# Patient Record
Sex: FEMALE | Race: WHITE | NOT HISPANIC OR LATINO | Employment: OTHER | URBAN - METROPOLITAN AREA
[De-identification: names, ages, dates, MRNs, and addresses within clinical notes are randomized per-mention and may not be internally consistent; named-entity substitution may affect disease eponyms.]

---

## 2019-01-13 ENCOUNTER — HOSPITAL ENCOUNTER (EMERGENCY)
Facility: CLINIC | Age: 34
Discharge: HOME | End: 2019-01-13
Admitting: PHYSICIAN ASSISTANT
Payer: COMMERCIAL

## 2019-01-13 ENCOUNTER — APPOINTMENT (OUTPATIENT)
Dept: GENERAL RADIOLOGY | Facility: CLINIC | Age: 34
End: 2019-01-13
Payer: COMMERCIAL

## 2019-01-13 VITALS
DIASTOLIC BLOOD PRESSURE: 82 MMHG | TEMPERATURE: 97.1 F | HEART RATE: 108 BPM | BODY MASS INDEX: 37.07 KG/M2 | OXYGEN SATURATION: 100 % | RESPIRATION RATE: 18 BRPM | WEIGHT: 243.83 LBS | SYSTOLIC BLOOD PRESSURE: 115 MMHG

## 2019-01-13 DIAGNOSIS — G56.12 MEDIAN NERVE NEUROPATHY, LEFT: ICD-10-CM

## 2019-01-13 DIAGNOSIS — M25.532 LEFT WRIST PAIN: ICD-10-CM

## 2019-01-13 PROCEDURE — 73130 X-RAY EXAM OF HAND: CPT | Mod: LT

## 2019-01-13 PROCEDURE — 99284 EMERGENCY DEPT VISIT MOD MDM: CPT | Mod: 25

## 2019-01-13 PROCEDURE — 73110 X-RAY EXAM OF WRIST: CPT | Mod: LT

## 2019-01-13 PROCEDURE — 29125 APPL SHORT ARM SPLINT STATIC: CPT | Mod: LT

## 2019-01-13 RX ORDER — DULOXETIN HYDROCHLORIDE 20 MG/1
20 CAPSULE, DELAYED RELEASE ORAL 2 TIMES DAILY
COMMUNITY

## 2019-01-13 ASSESSMENT — ENCOUNTER SYMPTOMS
WEAKNESS: 0
NUMBNESS: 1

## 2019-01-13 NOTE — ED PROVIDER NOTES
History     Chief Complaint:  Wrist injury    The history is provided by the patient.      Daylin Yates is a 33 year old female who presents for evaluation of a wrist injury. The patient reports that she was ice skating a few hours ago when she slipped and fell backwards, landing on her outstretched left hand. The patient states that she did not have a large amount of pain at first, but that the pain exacerbated over the next several hours. She states that she moved her wrist in the last hour and had a shooting pain all the way up her arm from her wrist, prompting her to present to the ED. Here, the patient notes that her finger tips feel numb and tingly, but this is most pronounced in her middle finger. She is right hand dominant.    Allergies:  Penicillins  Sulfa drugs  Latex     Medications:    Cymbalta  Mirena    Past Medical History:    Bipolar disorder  Fibromyalgia     Past Surgical History:    Knee arthroscopy  Wrist arthroscopy, clean/drain    Family History:    Diabetes  Breast cancer  Alzheimer disease     Social History:   Natalie Roe MD   Marital Status:  Single  Smoking status: never  Alcohol use: yes, 1 drink per week      Review of Systems   Musculoskeletal:        Left wrist pain   Neurological: Positive for numbness. Negative for weakness.   All other systems reviewed and are negative.    Physical Exam     Patient Vitals for the past 24 hrs:   BP Temp Temp src Pulse Heart Rate Resp SpO2 Weight   01/13/19 1650 115/82 97.1  F (36.2  C) Temporal 108 108 18 100 % 110.6 kg (243 lb 13.3 oz)        Physical Exam  MSK:  Normal movement through the elbow, wrist, and fingers without tendonous deficit.  No gross deformity or effusion.  Tenderness to palpation over left radial styloid and thenar eminence. No tenderness to palpation over anatomic snuffbox, metacarpal bones.  Pain with axial loading of the thumb.    SKIN:  Warm and dry with strong radial pulse and normal capillary refill.    NEURO:   Strength in wrist and thumb limited secondary to pain  Strength otherwise normal in fingers, wrist, elbow.  Decreased sensation through distribution of median nerve over 3rd finger more pronounced on volar surface.  Normal sensation throughout radian and ulnar distributions.  PSYCH:  Normal affect     Emergency Department Course     Imaging:  Radiographic findings were communicated with the patient who voiced understanding of the findings.    XR Wrist Left G/E 3 Views  Impression: No fracture is visualized. Bony alignment appears to be  within normal limits. No significant degenerative change.  As read by Radiology.     XR Hand Left G/E 3 Views   Impression: No fracture is visualized. Bony alignment appears to be  within normal limits. No significant degenerative change.  As read by Radiology.     Emergency Department Course:  Past medical records, nursing notes, and vitals reviewed.  165: I performed an exam of the patient and obtained history, as documented above.  The patient was sent for a Xray while in the emergency department, findings above.      175: I rechecked the patient. Explained findings to the patient.     I rechecked the patient. Findings and plan explained to the Patient. Patient discharged home with instructions regarding supportive care, medications, and reasons to return. The importance of close follow-up was reviewed.      Impression & Plan      Medical Decision Makin year old female presents with left wrist and hand pain after FOOSH.  Patient history and records reviewed.  Broad differential was considered.  Patient is well appearing with stable vitals.  X-rays were obtained which demonstrate no evidence of fracture or dislocation.  Examination of joint above and below does not suggest referred pain and do not feel radiographs of these joints are indicated at this time.  She does have some decreased sensation in the distribution of the median nerve, however strength appears normal.   Neurovascular status is otherwise intact distally and no signs of compartment syndrome.    I did consider occult scaphoid fracture, and she does have some pain with axial loading of the thumb, as well as minimal pain with deep palpation to the anatomic snuffbox.  For this reason will place in a removable thumb spica splint and have patient follow-up for repeat x-rays in 7-10 days.  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  No signs tendonous defect in fingers or hand.    Recommended conservative treatment with NSAIDS, ice.  Discussed indications to return to ED if any new or worsening symptoms develop.    Diagnosis:    ICD-10-CM    1. Left wrist painAcute M25.532    2. Median nerve neuropathy, leftAcute G56.12        Disposition:  discharged to home    Megan Beh  1/13/2019   [facility] EMERGENCY DEPARTMENT  I, Megan Beh, am serving as a scribe at 4:56 PM on 1/13/2019 to document services personally performed by Tera Bergman based on my observations and the provider's statements to me.       Tera Bergman PA-C  01/13/19 1818

## 2019-01-13 NOTE — ED AVS SNAPSHOT
[facility] Emergency Department  [address]  [facility] [address]  Phone:  [phone]  Fax:  [phone]                                    Daylin Yates   MRN: [id number]    Department:  [facility] Emergency Department   Date of Visit:  1/13/2019           After Visit Summary Signature Page    I have received my discharge instructions, and my questions have been answered. I have discussed any challenges I see with this plan with the nurse or doctor.    ..........................................................................................................................................  Patient/Patient Representative Signature      ..........................................................................................................................................  Patient Representative Print Name and Relationship to Patient    ..................................................               ................................................  Date                                   Time    ..........................................................................................................................................  Reviewed by Signature/Title    ...................................................              ..............................................  Date                                               Time          22EPIC Rev 08/18

## 2019-01-13 NOTE — ED TRIAGE NOTES
Pt was ice skating and fell, landing onto her left hand. Injury happened at about 1 pm today. Pt states she cannot feel her middle finger, movement intact. No meds taken for pain prior to arrival.

## 2020-01-07 ENCOUNTER — OFFICE VISIT (OUTPATIENT)
Dept: MIDWIFE SERVICES | Facility: CLINIC | Age: 35
End: 2020-01-07
Payer: COMMERCIAL

## 2020-01-07 VITALS
WEIGHT: 250 LBS | OXYGEN SATURATION: 98 % | DIASTOLIC BLOOD PRESSURE: 77 MMHG | SYSTOLIC BLOOD PRESSURE: 114 MMHG | BODY MASS INDEX: 38.01 KG/M2 | HEART RATE: 94 BPM

## 2020-01-07 DIAGNOSIS — Z30.432 ENCOUNTER FOR IUD REMOVAL: ICD-10-CM

## 2020-01-07 DIAGNOSIS — Z12.4 CERVICAL CANCER SCREENING: Primary | ICD-10-CM

## 2020-01-07 DIAGNOSIS — Z30.430 ENCOUNTER FOR IUD INSERTION: ICD-10-CM

## 2020-01-07 PROCEDURE — G0145 SCR C/V CYTO,THINLAYER,RESCR: HCPCS | Performed by: ADVANCED PRACTICE MIDWIFE

## 2020-01-07 PROCEDURE — 58300 INSERT INTRAUTERINE DEVICE: CPT | Performed by: ADVANCED PRACTICE MIDWIFE

## 2020-01-07 PROCEDURE — 87624 HPV HI-RISK TYP POOLED RSLT: CPT | Performed by: ADVANCED PRACTICE MIDWIFE

## 2020-01-07 PROCEDURE — 58301 REMOVE INTRAUTERINE DEVICE: CPT | Performed by: ADVANCED PRACTICE MIDWIFE

## 2020-01-07 PROCEDURE — G0476 HPV COMBO ASSAY CA SCREEN: HCPCS | Performed by: ADVANCED PRACTICE MIDWIFE

## 2020-01-07 NOTE — LETTER
January 14, 2020    Daylin Yates  [address] [facility] [address]    Dear ,  This letter is regarding your recent Pap smear (cervical cancer screening) and Human Papillomavirus (HPV) test.  We are happy to inform you that your Pap smear result is normal. Cervical cancer is closely linked with certain types of HPV. Your results showed no evidence of high-risk HPV.  We recommend you have your next PAP smear and HPV test in 5 years.  You will still need to return to the clinic every year for an annual exam and other preventive tests.  If you have additional questions regarding this result, please call our registered nurse, Jaki at [phone].  Sincerely,    Daylin Shukla CNM, APRN //[email or web address]

## 2020-01-07 NOTE — PROGRESS NOTES
Daylin Yates, who is a 34 year old female with an obstetric hstory of      Presents to day for IUD removal and replacement. She is also due for her pap smear.She has a mirena IUD in place and loves that it eliminated her long and painful periods, she would like a new Mirena replaced.     Risks and bens were reviewed including risk of infection, perforation of uterine muscle, IUD expulsion, and minimal risk of pregnancy.  Pt hx was reviewed and pt is appropriate candidate.         If choosing Mirena IUD, specific risks include irregular menses, amenorrhea and/or spotting especially in the next 3-6 months.    Pt was educated in checking for strings once a month and s/s of infection.    Verbal consent was obtained from the patient.    Exam:EXAM:  Constitutional: healthy, alert and no distress  Vagina and vulva are normal;  no discharge is noted.  Cervix normal without lesions. Uterus anteverted and mobile, normal in size and shape without tenderness.  Adnexa normal in size without masses or tenderness. Pap smear collected.    Removal procedure:   IUD strings visualized extending 2 cm from os. IUD strings were grasped with ringed forceps. The IUD was removed using gentle traction. IUD was inspected and appeared intact. Shown to patient. Daylin tolerated the removal procedure well.     Procedure: Sterile speculum placed, cervical os cleansed with beta dine.  Tenaculum placed on anterior  cervix, uterus easily sounded to 7.5 cm.   IUD placed without difficulty, strings trimmed to approx 1 inch outside of cervical os.    Lot # [id number] expiration date     Pt tolerated procedure well with minimal cramping.  D/C home with instruction letter.  Call with questions or concerns.    Daylin Shukla, JOSHUAM, APRN CNM

## 2020-01-09 LAB
COPATH REPORT: NORMAL
PAP: NORMAL

## 2020-01-12 LAB
FINAL DIAGNOSIS: NORMAL
HPV HR 12 DNA CVX QL NAA+PROBE: NEGATIVE
HPV16 DNA SPEC QL NAA+PROBE: NEGATIVE
HPV18 DNA SPEC QL NAA+PROBE: NEGATIVE
SPECIMEN DESCRIPTION: NORMAL
SPECIMEN SOURCE CVX/VAG CYTO: NORMAL

## 2020-02-22 ENCOUNTER — APPOINTMENT (OUTPATIENT)
Dept: CT IMAGING | Facility: CLINIC | Age: 35
End: 2020-02-22
Attending: INTERNAL MEDICINE
Payer: COMMERCIAL

## 2020-02-22 ENCOUNTER — HOSPITAL ENCOUNTER (OUTPATIENT)
Facility: CLINIC | Age: 35
Setting detail: OBSERVATION
Discharge: HOME | End: 2020-02-23
Attending: INTERNAL MEDICINE | Admitting: HOSPITALIST
Payer: COMMERCIAL

## 2020-02-22 DIAGNOSIS — R29.898 UPPER EXTREMITY WEAKNESS: ICD-10-CM

## 2020-02-22 DIAGNOSIS — R29.898 WEAKNESS OF RIGHT LOWER EXTREMITY: ICD-10-CM

## 2020-02-22 PROBLEM — R68.89 MULTIPLE COMPLAINTS: Status: ACTIVE | Noted: 2020-02-22

## 2020-02-22 LAB
ANION GAP SERPL CALCULATED.3IONS-SCNC: 4 MMOL/L (ref 3–14)
APTT PPP: 32 SEC (ref 22–37)
BASOPHILS # BLD AUTO: 0 10E9/L (ref 0–0.2)
BASOPHILS NFR BLD AUTO: 0.3 %
BUN SERPL-MCNC: 8 MG/DL (ref 7–30)
CALCIUM SERPL-MCNC: 9.2 MG/DL (ref 8.5–10.1)
CHLORIDE SERPL-SCNC: 104 MMOL/L (ref 94–109)
CO2 SERPL-SCNC: 29 MMOL/L (ref 20–32)
CREAT SERPL-MCNC: 0.66 MG/DL (ref 0.52–1.04)
DIFFERENTIAL METHOD BLD: ABNORMAL
EOSINOPHIL # BLD AUTO: 0.1 10E9/L (ref 0–0.7)
EOSINOPHIL NFR BLD AUTO: 0.8 %
ERYTHROCYTE [DISTWIDTH] IN BLOOD BY AUTOMATED COUNT: 12.6 % (ref 10–15)
GFR SERPL CREATININE-BSD FRML MDRD: >90 ML/MIN/{1.73_M2}
GLUCOSE BLDC GLUCOMTR-MCNC: 104 MG/DL (ref 70–99)
GLUCOSE SERPL-MCNC: 132 MG/DL (ref 70–99)
HCT VFR BLD AUTO: 39.6 % (ref 35–47)
HGB BLD-MCNC: 13 G/DL (ref 11.7–15.7)
IMM GRANULOCYTES # BLD: 0.1 10E9/L (ref 0–0.4)
IMM GRANULOCYTES NFR BLD: 0.4 %
INR PPP: 0.99 (ref 0.86–1.14)
LITHIUM SERPL-SCNC: 0.52 MMOL/L (ref 0.6–1.2)
LYMPHOCYTES # BLD AUTO: 2.3 10E9/L (ref 0.8–5.3)
LYMPHOCYTES NFR BLD AUTO: 17.5 %
MCH RBC QN AUTO: 28.3 PG (ref 26.5–33)
MCHC RBC AUTO-ENTMCNC: 32.8 G/DL (ref 31.5–36.5)
MCV RBC AUTO: 86 FL (ref 78–100)
MONOCYTES # BLD AUTO: 0.7 10E9/L (ref 0–1.3)
MONOCYTES NFR BLD AUTO: 5 %
NEUTROPHILS # BLD AUTO: 10 10E9/L (ref 1.6–8.3)
NEUTROPHILS NFR BLD AUTO: 76 %
NRBC # BLD AUTO: 0 10*3/UL
NRBC BLD AUTO-RTO: 0 /100
PLATELET # BLD AUTO: 336 10E9/L (ref 150–450)
POTASSIUM SERPL-SCNC: 3.6 MMOL/L (ref 3.4–5.3)
RBC # BLD AUTO: 4.59 10E12/L (ref 3.8–5.2)
SODIUM SERPL-SCNC: 137 MMOL/L (ref 133–144)
TROPONIN I SERPL-MCNC: <0.015 UG/L (ref 0–0.04)
WBC # BLD AUTO: 13.1 10E9/L (ref 4–11)

## 2020-02-22 PROCEDURE — 0042T CT HEAD PERFUSION WITH CONTRAST: CPT

## 2020-02-22 PROCEDURE — 70450 CT HEAD/BRAIN W/O DYE: CPT | Mod: 59

## 2020-02-22 PROCEDURE — 70496 CT ANGIOGRAPHY HEAD: CPT

## 2020-02-22 PROCEDURE — 25000132 ZZH RX MED GY IP 250 OP 250 PS 637: Performed by: PHYSICIAN ASSISTANT

## 2020-02-22 PROCEDURE — 99285 EMERGENCY DEPT VISIT HI MDM: CPT | Mod: 25

## 2020-02-22 PROCEDURE — 99220 ZZC INITIAL OBSERVATION CARE,LEVL III: CPT | Performed by: PHYSICIAN ASSISTANT

## 2020-02-22 PROCEDURE — 93005 ELECTROCARDIOGRAM TRACING: CPT

## 2020-02-22 PROCEDURE — G0378 HOSPITAL OBSERVATION PER HR: HCPCS

## 2020-02-22 PROCEDURE — 85025 COMPLETE CBC W/AUTO DIFF WBC: CPT | Performed by: INTERNAL MEDICINE

## 2020-02-22 PROCEDURE — 85610 PROTHROMBIN TIME: CPT | Performed by: INTERNAL MEDICINE

## 2020-02-22 PROCEDURE — 85730 THROMBOPLASTIN TIME PARTIAL: CPT | Performed by: INTERNAL MEDICINE

## 2020-02-22 PROCEDURE — 25000128 H RX IP 250 OP 636: Performed by: INTERNAL MEDICINE

## 2020-02-22 PROCEDURE — 80048 BASIC METABOLIC PNL TOTAL CA: CPT | Performed by: INTERNAL MEDICINE

## 2020-02-22 PROCEDURE — 00000146 ZZHCL STATISTIC GLUCOSE BY METER IP

## 2020-02-22 PROCEDURE — 84484 ASSAY OF TROPONIN QUANT: CPT | Performed by: INTERNAL MEDICINE

## 2020-02-22 PROCEDURE — 25000125 ZZHC RX 250: Performed by: INTERNAL MEDICINE

## 2020-02-22 PROCEDURE — 80178 ASSAY OF LITHIUM: CPT | Performed by: INTERNAL MEDICINE

## 2020-02-22 RX ORDER — NALOXONE HYDROCHLORIDE 0.4 MG/ML
.1-.4 INJECTION, SOLUTION INTRAMUSCULAR; INTRAVENOUS; SUBCUTANEOUS
Status: DISCONTINUED | OUTPATIENT
Start: 2020-02-22 | End: 2020-02-23 | Stop reason: HOSPADM

## 2020-02-22 RX ORDER — ACETAMINOPHEN 650 MG/1
650 SUPPOSITORY RECTAL EVERY 4 HOURS PRN
Status: DISCONTINUED | OUTPATIENT
Start: 2020-02-22 | End: 2020-02-23 | Stop reason: HOSPADM

## 2020-02-22 RX ORDER — IOPAMIDOL 755 MG/ML
500 INJECTION, SOLUTION INTRAVASCULAR ONCE
Status: COMPLETED | OUTPATIENT
Start: 2020-02-22 | End: 2020-02-22

## 2020-02-22 RX ORDER — LITHIUM CARBONATE 300 MG/1
600 TABLET, FILM COATED, EXTENDED RELEASE ORAL 2 TIMES DAILY
COMMUNITY

## 2020-02-22 RX ORDER — DULOXETIN HYDROCHLORIDE 20 MG/1
20 CAPSULE, DELAYED RELEASE ORAL 2 TIMES DAILY
Status: DISCONTINUED | OUTPATIENT
Start: 2020-02-22 | End: 2020-02-23 | Stop reason: HOSPADM

## 2020-02-22 RX ORDER — ONDANSETRON 2 MG/ML
4 INJECTION INTRAMUSCULAR; INTRAVENOUS EVERY 6 HOURS PRN
Status: DISCONTINUED | OUTPATIENT
Start: 2020-02-22 | End: 2020-02-23 | Stop reason: HOSPADM

## 2020-02-22 RX ORDER — MULTIVITAMIN,THERAPEUTIC
1 TABLET ORAL DAILY
COMMUNITY

## 2020-02-22 RX ORDER — ONDANSETRON 4 MG/1
4 TABLET, ORALLY DISINTEGRATING ORAL EVERY 6 HOURS PRN
Status: DISCONTINUED | OUTPATIENT
Start: 2020-02-22 | End: 2020-02-23 | Stop reason: HOSPADM

## 2020-02-22 RX ORDER — ACETAMINOPHEN 325 MG/1
650 TABLET ORAL EVERY 4 HOURS PRN
Status: DISCONTINUED | OUTPATIENT
Start: 2020-02-22 | End: 2020-02-23 | Stop reason: HOSPADM

## 2020-02-22 RX ORDER — TRIAMCINOLONE ACETONIDE 1 MG/G
CREAM TOPICAL 2 TIMES DAILY PRN
COMMUNITY

## 2020-02-22 RX ORDER — LITHIUM CARBONATE 300 MG/1
600 TABLET, FILM COATED, EXTENDED RELEASE ORAL 2 TIMES DAILY
Status: DISCONTINUED | OUTPATIENT
Start: 2020-02-22 | End: 2020-02-23 | Stop reason: HOSPADM

## 2020-02-22 RX ADMIN — SODIUM CHLORIDE 95 ML: 9 INJECTION, SOLUTION INTRAVENOUS at 15:53

## 2020-02-22 RX ADMIN — IOPAMIDOL 120 ML: 755 INJECTION, SOLUTION INTRAVENOUS at 15:53

## 2020-02-22 RX ADMIN — DULOXETINE HYDROCHLORIDE 20 MG: 20 CAPSULE, DELAYED RELEASE ORAL at 21:28

## 2020-02-22 RX ADMIN — LITHIUM CARBONATE 600 MG: 300 TABLET, FILM COATED, EXTENDED RELEASE ORAL at 21:28

## 2020-02-22 ASSESSMENT — ENCOUNTER SYMPTOMS
WEAKNESS: 1
HEADACHES: 1
CONFUSION: 1

## 2020-02-22 NOTE — ED NOTES
Bed: ED02  Expected date: 2/22/20  Expected time:   Means of arrival: Ambulance  Comments:  A594; 34F STROKE EVAL

## 2020-02-22 NOTE — PHARMACY-ADMISSION MEDICATION HISTORY
.Admission medication history interview status for this patient is complete. See [facility] admission navigator for allergy information, prior to admission medications and immunization status.     Medication history interview source(s):Patient  Medication history resources (including written lists, pill bottles, clinic record):med list  [facility]    Changes made to PTA medication list:  Added: lithobid, mvi, and tmc cream  Deleted: prenatal  Changed: none    Actions taken by pharmacist (provider contacted, etc):None     Additional medication history information:None    Medication reconciliation/reorder completed by provider prior to medication history?  Yes     Do you take OTC medications (eg tylenol, ibuprofen, fish oil, eye/ear drops, etc)? Yes     For patients on insulin therapy: No      Prior to Admission medications    Medication Sig Last Dose Taking? Auth Provider   DULoxetine (CYMBALTA) 20 MG capsule Take 20 mg by mouth 2 times daily 2/22/2020 at 1100 Yes Reported, Patient   lithium ER (LITHOBID) 300 MG CR tablet Take 600 mg by mouth 2 times daily 2/22/2020 at 1100 Yes Unknown, Entered By History   multivitamin, therapeutic (THERA-VIT) TABS tablet Take 1 tablet by mouth daily 2/21/2020 at Unknown time Yes Unknown, Entered By History   levonorgestrel (MIRENA) 20 MCG/24HR IUD 1 each (20 mcg) by Intrauterine route once for 1 dose   Daylin Shukla APRN CNM   levonorgestrel (MIRENA) 20 MCG/24HR IUD 1 each (20 mcg) by Intrauterine route once for 1 dose   Dell Roman APRN CNM   triamcinolone (KENALOG) 0.1 % external cream Apply topically 2 times daily as needed for irritation (eczema) More than a month at Unknown time  Unknown, Entered By History

## 2020-02-22 NOTE — CONSULTS
[facility]    Stroke Telephone Note    I was called by Dr. Dayanna Pabon on 02/22/20 at 1541 regarding patient Daylin Yates. The patient is a 34 year old female with no past medical history who presented with acute onset of vague neurologic symptoms.  Per Dr. Pabon the patient reported that around 1330 the patient developed tunnel vision in her right eye only, twitching in her right eye, and weakness in her right arm and right leg.  On exam she is intermittently able to follow a finger for EOMs but then occasionally struggles.  On exam she is reportedly able to raise both her right arm and right leg and maintain somewhat but then they both abruptly fall to the bed.  No other deficits noted on exam.    Of note she was seen in neurology clinic on 2/3/20 for multiple vague neurologic complaints.  She had reported paresthesias in all four limbs, muscle spasms, dizziness, occasional headache, tremors, decreased vision I her left eye, shooting sensation down her spine, and tremors in her hands.  She has had MRI of her entire spine in 2016 or 2017 which was normal.  She had a MRI Brain without and with IV contrast which was normal on 2/4/20.    Stroke Code Data  (for stroke code without tele)  Stroke code activated 02/22/20   1540   First stroke provider response         Last known normal 02/22/20   1330   Time of discovery   (or onset of symptoms) 02/22/20   1330   Head CT read by me 02/22/20   1546   Was stroke code de-escalated? Yes 02/22/20 1604  other (see comments) No LVO seen, mild deficits that seem to not be consistent with an infarction, recent neurologic work up that was negative     TPA Treatment   Not given due to suspected stroke mimic, vague neurologic symptoms and do not seen disabling, recently had full neurologic work up for vauge symptoms.    Endovascular Treatment  Not initiated due to absence of proximal vessel occlusion    Impression  Vague neurologic  deficits    Recommendations  - MRI Brain without and with IV contrast  - PT/OT  - If MRI negative for stroke then recommend general neurology consultation, if positive admitting team can place consult for telestroke evaluation.    My recommendations are based on the information provided on the phone by Dr. Dayanna Pabon.    Laurel Wynn MD   Neurocritical Care    Text Page ([phone])

## 2020-02-22 NOTE — ED TRIAGE NOTES
"Arrives via EMS. At 1330 today patient developed right sided weakness and \"tunnel vision\" in right eye. 1/10 headache on left side. Feels like she is confused, \"couldn't remember my daughters name\" No new medications per patient. Vitals stable. Blood pressure 92/48. EKG negative. . Stroke eval called.   "

## 2020-02-22 NOTE — H&P
[facility]  Outpatient/Observation Unit  Admission History and Physical Examination    NAME: Daylin Yates   : 1985   MRN: [id number]     Date of Admission:  2020    Assessment & Plan   Daylin Yates is a 34 year old female with past medical history of depression, bipolar disorder, atypical neurologic symptoms who presents with her parents complaining of weakness of right upper and lower extremity as well as double vision in her right eye that developed today when playing with her daughter.      Patient presented to ED afebrile, VSS.  Work-up revealed glucose 132, BMP within normal limits, initial troponin under 0.015.  CBC with slight neutrophilic leukocytosis of 13.1, no anemia.  CT of the head without contrast showed no evidence of acute infarct or significant orbital abnormality.  CT a head and neck negative for significant stenosis or occlusion in the anterior or posterior circulation.  CT of the head without focal or regional perfusion deficits. EKG NSR, rate 87.     I was asked to admit the patient to observation for further monitoring.     #Vague neurologic complaints  With RLE and RUE weakness, tunnel to double vision change in right eye.  On my evaluation able to utilize extremities without limitation.   Unclear etiology at this time, atypical symptoms unlikely due to CVA or TIA.  Patient has recently had a full neurologic work-up as outlined below for similar atypical symptoms, further details documented in Neurology note on chart review.   - Stroke Neurology was consulted in the ED; recommended obtaining repeat MRI, no Plavix loading  - Consider PT/OT  - Nursing neuro checks    #History of Multiple areas of Paraesthesias  #Possible Migraine Headaches  #Unspecified Vision loss  Patient has a variety of symptoms without localization to one lesion, absence of any objective findings.  Has been seen in the outpatient setting recently by Dr. Tenorio with [facility]  "Neurology; Evaluated patient with MRI and connective tissue disorders which were negative per chart review.    #Leukocytosis  Neutrophilic leukocytosis of unclear etiology at this time.  Patient is afebrile, nonseptic appearing and without focal symptoms.  - Monitor for fever or symtpoms  - Repeat CBC in a.m.    #Bipolar Disorder  #Depression  Stable per patient.   -Resume PTA lithium, Cymbalta    DVT Prophylaxis: Ambulate every shift  Code Status: Full Code  Expected discharge: Tomorrow, recommended to prior living arrangement once MRI completed, return to baseline functional status..    Emerita Gupta PA-C    Primary Care Physician   *[facility]    Chief Complaint   Weakness, vision changes    History is obtained from the patient.  Patient's parents and child present for my evaluation.    Discussed with Dr. Pabon in the ED, full chart review including lab work, imaging, and vital signs were reviewed.  Dr. Cabrales discussed the patient with stroke neurology who recommended obtaining repeat MRI.       History of Present Illness   Daylin Yates is a 34 year old female with a past medical history of bipolar disorder, fibromyalgia, possible migraine headaches, multiple areas of paraesthesias who presents with complaints of weakness and vision changes.  The patient reports that she was in her usual state of health this morning when she awoke.  She was sitting on the floor and playing with her daughter when she felt as if she was having a \"dizzy spell \"she describes this as feeling lightheaded and then that the room was spinning.  This episode lasted for a few minutes.  She then noticed a diffuse tingling sensation throughout her whole body which she is still having.  She additionally describes both right arm and right leg generalized weakness, which she states is still present on my evaluation however is able to adjust her gown and remove her glasses with her right hand.  The patient states that she " "got up to walk around and make her daughter a sandwich later on and felt progressively more weak.  She had difficulty holding onto the knife to cut her sandwich in half.  Around 130 she then noticed that she was having a change in her vision described as tunnellike vision in her right eye worse than her left.  The tunnel vision lasted for several minutes and then became blurry. She than experience bilateral eye twitching. She contacted the [facility] line who advised her to present to the ED.  Upon waiting for EMS to arrive she described that she became slightly confused and described this as being unable to recall the last to get that set her phone number and her daughter's name.  She additionally noted that her face became hot feeling on the right side.  She notes that the symptoms are similar to what she has had in the past however \"everything occurred together this time\" and then she became slightly sleepy feeling.  The patient cannot quantify or specify how often the symptoms occur.  She states that she does have baseline vision problems described as astigmatism in her right eye.  She sees an optometrist and wears glasses for this.  No new medication changes or drug ingestions.   The patient reports hitting her head on her bed frame about a week and a half ago no other injuries.  She denies losing consciousness with this.  No falls, syncope or presyncope.  Endorses a diffuse chronic tension type headache.  No chest pain, shortness of breath, cough, dyspnea.  No fevers, chills, abdominal pain, emesis.      Past Medical History    I have reviewed this patient's medical history and updated it with pertinent information if needed.   Past Medical History:   Diagnosis Date     Bipolar disorder (H) 2011    Lamictal weaned off at beginning of preg     Ectopic pregnancy 2007    MTX       Past Surgical History   I have reviewed this patient's surgical history and updated it with pertinent information if " needed.  Past Surgical History:   Procedure Laterality Date     ARTHROSCOPY KNEE       C WRIST ARTHROSCOP,CLEAN/DRAIN       ESOPHAGOSCOPY, GASTROSCOPY, DUODENOSCOPY (EGD), COMBINED  10/16/2012    Procedure: COMBINED ESOPHAGOSCOPY, GASTROSCOPY, DUODENOSCOPY (EGD), BIOPSY SINGLE OR MULTIPLE;;  Surgeon: Johny Celaya MD;  Location: [facility]     PODIATRY/FOOT & ANKLE SURGERY REFERRAL       wisdom teeth         Prior to Admission Medications   Prior to Admission Medications   Prescriptions Last Dose Informant Patient Reported? Taking?   DULoxetine (CYMBALTA) 20 MG capsule   Yes No   Sig: Take 20 mg by mouth 2 times daily   PRENATAL VITAMINS PO   Yes No   levonorgestrel (MIRENA) 20 MCG/24HR IUD   No No   Si each (20 mcg) by Intrauterine route once for 1 dose   levonorgestrel (MIRENA) 20 MCG/24HR IUD   No No   Si each (20 mcg) by Intrauterine route once for 1 dose      Facility-Administered Medications: None     Allergies   Allergies   Allergen Reactions     Penicillins Anaphylaxis     Sulfa Drugs Hives     Latex Hives       Social History   I have reviewed this patient's social history and updated it with pertinent information if needed. Daylin Yates  reports that she has never smoked. She has never used smokeless tobacco. She reports current alcohol use. She reports that she does not use drugs.  The patient has 1 daughter.      Family History   I have reviewed this patient's family history and updated it with pertinent information if needed.   Family History   Problem Relation Age of Onset     Diabetes Maternal Aunt      Diabetes Maternal Grandmother      Diabetes Sister 3     Diabetes Maternal Uncle 18     Breast Cancer Paternal Aunt      Alzheimer Disease Paternal Grandmother        Review of Systems   The 10 point Review of Systems is negative other than noted in the HPI or here.     Physical Exam   Temp: 97.7  F (36.5  C) Temp src: Oral BP: 134/85 Pulse: 103 Heart Rate: 98 Resp: 17 SpO2: 100 % O2 Device:  None (Room air)    Vital Signs with Ranges  Temp:  [97.7  F (36.5  C)] 97.7  F (36.5  C)  Pulse:  [103] 103  Heart Rate:  [] 98  Resp:  [17-18] 17  BP: (134)/(85) 134/85  SpO2:  [100 %] 100 %  0 lbs 0 oz    Constitutional: Awake, alert,  no apparent distress.  Eyes: Conjunctiva and pupils examined and normal.  Visual fields intact bilaterally.  No deficits in extraocular eye movements.  The patient reports double vision in her right eye.   HEENT: Moist mucous membranes, normal dentition.  Respiratory: Clear to auscultation bilaterally, no crackles or wheezing.  Cardiovascular: Tachycardic rate, regular rhythm, normal S1 and S2, and no murmur noted.  GI: Soft, non-distended, non-tender, bowel sounds present. No rebound tenderness or guarding.  Lymph/Hematologic: No anterior cervical or supraclavicular adenopathy.  Skin: No rashes, no cyanosis, no edema.  Musculoskeletal: No deformities noted.  No erythema or tenderness. Moving all extremities with slightly diminished strength in right arm and right leg. Patellar reflexes intact and symmetrical bilaterally.  Gross sensation intact bilateral lower extremities.   Neurologic: No focal deficits noted. Speech is clear. Coordination and strength grossly normal.   Psychiatric: Appropriate affect.    Data   Data reviewed today:      EKG: NSR rate 87.   Imaging:   Recent Results (from the past 24 hour(s))   CT Head w/o Contrast    Addendum: 2/22/2020    Further history is available: Easily fatigued right upper extremity.  Eye twitching. Staring spells.    YARI PETTY MD      Narrative    CT OF THE HEAD WITHOUT CONTRAST   2/22/2020 3:50 PM     COMPARISON: None.    HISTORY:  Stroke.     TECHNIQUE:  Axial CT images of the head from the skull base to the  vertex were acquired without IV contrast.    FINDINGS:   INTRACRANIAL CONTENTS: No intracranial hemorrhage, extraaxial  collection, or mass effect.  No CT evidence of acute infarct. Normal  parenchymal density for age.  The ventricles and sulci are normal for  age.    VISUALIZED ORBITS/SINUSES/MASTOIDS: No significant orbital  abnormality. No significant paranasal sinus mucosal disease. No  significant middle ear or mastoid effusion.    OSSEOUS STRUCTURES/SOFT TISSUES: No significant abnormality.      Impression    IMPRESSION:  Normal for age.    Radiation dose for this scan was reduced using automated exposure  control, adjustment of the mA and/or kV according to patient size, or  iterative reconstruction technique    YARI PETTY MD   CTA Head Neck with Contrast    Narrative    HEAD CT AND CT ANGIOGRAM OF THE HEAD AND NECK WITHOUT AND WITH  CONTRAST  2/22/2020 3:51 PM     COMPARISON: None    HISTORY: Stroke    TECHNIQUE:    Precontrast localizing scans were followed by CT angiography with an  injection of 70mL Isovue-370 nonionic intravenous contrast material  with scans through the head and neck.  Images were transferred to a  separate 3-D workstation where multiplanar reformations and 3-D images  were created.  Estimates of carotid stenoses are made relative to the  distal internal carotid artery diameters except as noted.      FINDINGS:   HEAD CTA:  ANTERIOR CIRCULATION: No significant stenosis or occlusion. Standard  [place] anatomy.    POSTERIOR CIRCULATION: No significant stenosis or occlusion. Balanced  vertebral arteries supply a normal basilar artery.    ANEURYSM/VASCULAR MALFORMATION: None.    NECK CTA:  RIGHT CAROTID: No measurable stenosis in the right ICA based on NASCET  criteria.    LEFT CAROTID: No measurable stenosis in the left ICA based on NASCET  criteria.     VERTEBRAL ARTERIES: Balanced vertebral arteries are patent in the neck  and into the head.     AORTIC ARCH: Classic aortic arch anatomy with no significant stenosis  at the origin of the great vessels.      Impression    IMPRESSION:  HEAD CTA:  1.  Normal head CTA.  2.  No significant stenosis.  No aneurysm or vascular malformation.    NECK  CTA:  1.  Normal neck CTA  for age.  2.  No significant stenosis as per NASCET criteria.    Radiation dose for this scan was reduced using automated exposure  control, adjustment of the mA and/or kV according to patient size, or  iterative reconstruction technique    YARI PETTY MD   CT Head Perfusion w Contrast    Narrative    CT BRAIN PERFUSION 2/22/2020 3:54 PM    COMPARISON: None    HISTORY: Stroke    TECHNIQUE: Time sequential axial CT images of the head were acquired  during the administration of intravenous contrast (50mL Isovue-370).  CTA images of the [place] as well as color perfusion maps of  the brain were created from this time sequential axial source data.    FINDINGS: There are no focal or regional perfusion defects in the  brain.      Impression    IMPRESSION: Normal CT perfusion of the brain.      Radiation dose for this scan was reduced using automated exposure  control, adjustment of the mA and/or kV according to patient size, or  iterative reconstruction technique    YARI PETTY MD    Head CT, head CTA, neck CTA and CT perfusion findings were discussed  with Dr. Pabon at 4:10 PM hours on 2/22/2020.       Recent Labs   Lab 02/22/20  1538   WBC 13.1*   HGB 13.0   MCV 86      INR 0.99      POTASSIUM 3.6   CHLORIDE 104   CO2 29   BUN 8   CR 0.66   ANIONGAP 4   SUGEY 9.2   *   TROPI <0.015           Signed:  Emerita Gupta PA-C  February 22, 2020 at 4:44 PM

## 2020-02-22 NOTE — ED PROVIDER NOTES
"  History     Chief Complaint:  One-sided Weakness    HPI   Daylin Yates is a 34 year old female who presents to the emergency department today with one sided weakness. She reports twitching and \"tunnel vision\" of her tight eye, with associated right sided weakness which started at 1330 today (2 hours ago). After this started, she developed a mild headache she thought was due to the eye twitching. She states she could not remember her daughter's name and feels overall confused.     Allergies:  Penicillins  Sulfa Drugs  Latex     Medications:    Cymbalta  Mirena  Biotin  Lithobid      Past Medical History:    Bipolar disorder   Ectopic pregnancy   Paresthesia   Tremor   Vision changes  Psoriasis   Fibromyalgia   Chronic migraine without aura      Past Surgical History:    Arthroscopy knee  EGD  Podiatry foot and ackle surgery   [place] teeth surgery     Family History:    Diabetes - multiple 1st and 2nd degree relatives   Breast cancer - aunt   Grandmother - Alzheimer's      Social History:  The patient was accompanied to the ED by father.  Smoking Status: Never  Smokeless Tobacco: Never  Alcohol Use: Yes   Marital Status:  Single    Review of Systems   Eyes: Positive for visual disturbance (right eye).        Right eye twitching     Neurological: Positive for weakness (right side) and headaches.   Psychiatric/Behavioral: Positive for confusion.   All other systems reviewed and are negative.      Physical Exam     Patient Vitals for the past 24 hrs:   BP Temp Temp src Pulse Heart Rate Resp SpO2   02/22/20 1611 -- -- -- -- 98 17 100 %   02/22/20 1537 134/85 97.7  F (36.5  C) Oral 103 103 18 100 %      Physical Exam  Constitutional:       Comments: Pleasant and cooperative   HENT:      Right Ear: Tympanic membrane normal.      Left Ear: Tympanic membrane normal.      Mouth/Throat:      Pharynx: No posterior oropharyngeal erythema.   Eyes:      Conjunctiva/sclera: Conjunctivae normal.   Neck:      Musculoskeletal: " Neck supple.   Cardiovascular:      Rate and Rhythm: Normal rate and regular rhythm.      Heart sounds: Normal heart sounds.   Pulmonary:      Effort: Pulmonary effort is normal.      Breath sounds: Normal breath sounds.   Abdominal:      General: Bowel sounds are normal. There is no distension.      Palpations: Abdomen is soft.      Tenderness: There is no abdominal tenderness. There is no guarding or rebound.   Musculoskeletal: Normal range of motion.   Skin:     General: Skin is warm and dry.   Neurological:      Mental Status: She is alert.      Comments: In testing extraocular motions patient has a difficult time moving to extremes in any field, seems to fatigue easily and return back to midline.  She does not have any visual field cut but does describe the tunnel vision in the right eye.  On strength testing she is able to raise her arm but then it falls back to the bed is fatigued.  Similar findings in the right leg.       National [employer] Stroke Scale  Exam Interval: Baseline   Score    Level of consciousness: (0)   Alert, keenly responsive    LOC questions: (0)   Answers both questions correctly    LOC commands: (0)   Performs both tasks correctly    Best gaze: (0)   Normal    Visual: (0)   No visual loss    Facial palsy: (0)   Normal symmetrical movements    Motor arm (left): (0)   No drift    Motor arm (right): (2)   Some effort against gravity    Motor leg (left): (0)   No drift    Motor leg (right): (2)   Some effort against gravity    Limb ataxia: (2)   Present in two limbs    Sensory: (0)   Normal- no sensory loss    Best language: (0)   Normal- no aphasia    Dysarthria: (0)   Normal    Extinction and inattention: (0)   No abnormality        Total Score:  6       Emergency Department Course   ECG:  Indication: confusion  Completed at 1707.  Read at 1712.   Normal sinus rhythm   Normal ECG    Rate 87 bpm. MD interval 136. QRS duration 84. QT/QTc 368/442. P-R-T axes 42 56 35.      Imaging:  Radiology findings were communicated with the patient and family who voiced understanding of the findings.  CT Head Perfusion w Contrast   Final Result   IMPRESSION: Normal CT perfusion of the brain.         Radiation dose for this scan was reduced using automated exposure   control, adjustment of the mA and/or kV according to patient size, or   iterative reconstruction technique      YARI PETTY MD      Head CT, head CTA, neck CTA and CT perfusion findings were discussed   with Dr. Pabon at 4:10 PM hours on 2/22/2020.      CTA Head Neck with Contrast   Final Result   IMPRESSION:   HEAD CTA:   1.  Normal head CTA.   2.  No significant stenosis.  No aneurysm or vascular malformation.      NECK CTA:   1.  Normal neck CTA  for age.   2.  No significant stenosis as per NASCET criteria.      Radiation dose for this scan was reduced using automated exposure   control, adjustment of the mA and/or kV according to patient size, or   iterative reconstruction technique      YARI PETTY MD      CT Head w/o Contrast   Final Result   Addendum 1 of 1   Further history is available: Easily fatigued right upper extremity.   Eye twitching. Staring spells.      YARI PETTY MD      Final      Report per radiology      Laboratory:  Laboratory findings were communicated with the patient and family who voiced understanding of the findings.  CBC: AWNL (WBC 13.1, HGB 13.0, )   BMP: 132 glucose o/w WNL (Creatinine 0.66)   Troponin (Collected 1538): <0.015  PTT: 32   INR: 0.99   Lithium level: 0.52   Recent Labs   Lab 02/22/20  1538 02/22/20  1533   *  --    BGM  --  104*       Emergency Department Course:  Nursing notes and vitals reviewed.  1535: I performed an exam of the patient as documented above.   IV was inserted and blood was drawn for laboratory testing, results above.  The patient was sent for a CT Head, CTA Head Neck, and CT Head Perfusion while in the emergency department, results above.    1542: I spoke with Dr. Aldana of the Stroke Neurology service regarding patient's presentation, findings, and plan of care.  1600:I spoke with Dr. Aldana of the Stroke Neurology service regarding patient's presentation, findings, and plan of care.  1602: Patient rechecked and updated.    1639: Findings and plan explained to the Patient who consents to admission. [facility], who will admit the patient to an Observation bed for further monitoring, evaluation, and treatment.   I personally reviewed the laboratory and imaging results with the Patient and father and answered all related questions prior to admission.       Impression & Plan    Medical Decision Making:    Daylin Yates is a 34 year old female who arrives in the emergency department by ambulance with right-sided visual symptoms and right-sided weakness.  With this presentation I was immediately concerned about stroke and code stroke was called.  I spoke with stroke neurology and we did discuss that her symptoms were not typical of stroke as she had tunnel vision and sort of easy fatigability of the extremities rather than a true paralysis.  She is low risk for ischemic stroke.  Under the circumstances stroke neurology agrees that she is not a good candidate for thrombolytics.  In addition, in reviewing her chart she has had multiple neurologic complaints which have been found to have no definite etiology.  She mentions spells of staring and its possible these represent some sort of seizure syndrome.  Dr. Aldana felt repeat MRI was appropriate along with admission for observation and neurologic consultation.  She will be admitted to the observation area for further evaluation and management.    Diagnosis:    ICD-10-CM    1. Weakness of right lower extremity R29.898 Troponin I     Lithium level   2. Upper extremity weakness R29.898        Disposition:  Admitted to Observation      Scribe Disclosure:  I,  Abby Ruelas MD, am serving as a scribe at 3:38 PM on 2/22/2020 to document services personally performed by Dayanna Pabon MD based on my observations and the provider's statements to me.    2/22/2020   [facility] EMERGENCY DEPARTMENT       Dayanna Pabon MD  02/22/20 [id number]

## 2020-02-22 NOTE — ED NOTES
"RECEIVING UNIT ED HANDOFF REVIEW    Above ED Nurse Handoff Report was reviewed: Yes  Reviewed by: Clementina Castellanos RN on February 22, 2020 at 5:32 PM         [facility]  ED Nurse Handoff Report    Daylin Yates is a 34 year old female   ED Chief complaint: One-sided Weakness  . ED Diagnosis:   Final diagnoses:   Weakness of right lower extremity   Upper extremity weakness     Allergies:   Allergies   Allergen Reactions     Penicillins Anaphylaxis     Sulfa Drugs Hives     Latex Hives       Code Status: Prior  Activity level - Baseline/Home:  Independent. Activity Level - Current:   Unable to Assess. Lift room needed: No. Bariatric: No   Needed: No   Isolation: No. Infection: Not Applicable.     Vital Signs:   Vitals:    02/22/20 1537 02/22/20 1611   BP: 134/85    Pulse: 103    Resp: 18 17   Temp: 97.7  F (36.5  C)    TempSrc: Oral    SpO2: 100% 100%       Cardiac Rhythm:  ,      Pain level:    Patient confused: No. Patient Falls Risk: Yes.   Elimination Status: Has voided.   Patient Report - Initial Complaint: Arrives via EMS. At 1330 today patient developed right sided weakness and \"tunnel vision\" in right eye. 1/10 headache on left side. Feels like she is confused, \"couldn't remember my daughters name\" No new medications per patient. Vitals stable. Blood pressure 92/48. EKG negative. . Stroke eval called. . Focused Assessment: Other flowsheet entries - Level Of Consciousness: alert  Arousal Level: opens eyes spontaneously  Speech: clear  Visual Field Cuts: other (see comments) (right eye feels like tunnel vision) Orientation: oriented x 4  Best Language: 0 - No aphasia  Left Upper: 5 - active movement against gravity and full resistance  Right Upper: 3 - active movement against gravity  Left Lower: 5 - active movement against gravity and full resistance  Right Lower: 3 - active movement against gravity  Headache: None    Tests Performed: CT, Labs. Abnormal Results:   CT Head " Perfusion w Contrast   Final Result   IMPRESSION: Normal CT perfusion of the brain.         Radiation dose for this scan was reduced using automated exposure   control, adjustment of the mA and/or kV according to patient size, or   iterative reconstruction technique      YARI PETTY MD      Head CT, head CTA, neck CTA and CT perfusion findings were discussed   with Dr. Pabon at 4:10 PM hours on 2/22/2020.      CTA Head Neck with Contrast   Final Result   IMPRESSION:   HEAD CTA:   1.  Normal head CTA.   2.  No significant stenosis.  No aneurysm or vascular malformation.      NECK CTA:   1.  Normal neck CTA  for age.   2.  No significant stenosis as per NASCET criteria.      Radiation dose for this scan was reduced using automated exposure   control, adjustment of the mA and/or kV according to patient size, or   iterative reconstruction technique      YARI PETTY MD      CT Head w/o Contrast   Final Result   Addendum 1 of 1   Further history is available: Easily fatigued right upper extremity.   Eye twitching. Staring spells.      YARI PETTY MD      Final        Labs Ordered and Resulted from Time of ED Arrival Up to the Time of Departure from the ED   BASIC METABOLIC PANEL - Abnormal; Notable for the following components:       Result Value    Glucose 132 (*)     All other components within normal limits   CBC WITH PLATELETS DIFFERENTIAL - Abnormal; Notable for the following components:    WBC 13.1 (*)     Absolute Neutrophil 10.0 (*)     All other components within normal limits   GLUCOSE BY METER - Abnormal; Notable for the following components:    Glucose 104 (*)     All other components within normal limits   INR   PARTIAL THROMBOPLASTIN TIME   TROPONIN I   LITHIUM LEVEL     .   Treatments provided: Monitor  Family Comments: Family at bedside   OBS brochure/video discussed/provided to patient:  Yes  ED Medications:   Medications   iopamidol (ISOVUE-370) solution 500 mL (120 mLs Intravenous Given  2/22/20 1203)   sodium chloride 0.9 % bag 500mL for CT scan flush use (95 mLs Intravenous Given 2/22/20 1553)     Drips infusing:  No  For the majority of the shift, the patient's behavior Green. Interventions performed were NA.    Sepsis treatment initiated: No       ED Nurse Name/Phone Number: Lona Ramirez RN,   4:13 PM

## 2020-02-23 ENCOUNTER — APPOINTMENT (OUTPATIENT)
Dept: MRI IMAGING | Facility: CLINIC | Age: 35
End: 2020-02-23
Attending: PHYSICIAN ASSISTANT
Payer: COMMERCIAL

## 2020-02-23 VITALS
WEIGHT: 254 LBS | HEART RATE: 95 BPM | OXYGEN SATURATION: 94 % | RESPIRATION RATE: 16 BRPM | TEMPERATURE: 100.3 F | BODY MASS INDEX: 38.62 KG/M2 | SYSTOLIC BLOOD PRESSURE: 123 MMHG | DIASTOLIC BLOOD PRESSURE: 69 MMHG

## 2020-02-23 LAB
BASOPHILS # BLD AUTO: 0 10E9/L (ref 0–0.2)
BASOPHILS NFR BLD AUTO: 0.2 %
DIFFERENTIAL METHOD BLD: ABNORMAL
EOSINOPHIL # BLD AUTO: 0.1 10E9/L (ref 0–0.7)
EOSINOPHIL NFR BLD AUTO: 0.9 %
ERYTHROCYTE [DISTWIDTH] IN BLOOD BY AUTOMATED COUNT: 12.8 % (ref 10–15)
HCT VFR BLD AUTO: 41 % (ref 35–47)
HGB BLD-MCNC: 13.3 G/DL (ref 11.7–15.7)
IMM GRANULOCYTES # BLD: 0.1 10E9/L (ref 0–0.4)
IMM GRANULOCYTES NFR BLD: 0.4 %
INTERPRETATION ECG - MUSE: NORMAL
LACTATE BLD-SCNC: 0.8 MMOL/L (ref 0.7–2)
LYMPHOCYTES # BLD AUTO: 0.4 10E9/L (ref 0.8–5.3)
LYMPHOCYTES NFR BLD AUTO: 3.5 %
MCH RBC QN AUTO: 27.9 PG (ref 26.5–33)
MCHC RBC AUTO-ENTMCNC: 32.4 G/DL (ref 31.5–36.5)
MCV RBC AUTO: 86 FL (ref 78–100)
MONOCYTES # BLD AUTO: 0.6 10E9/L (ref 0–1.3)
MONOCYTES NFR BLD AUTO: 5 %
NEUTROPHILS # BLD AUTO: 11.5 10E9/L (ref 1.6–8.3)
NEUTROPHILS NFR BLD AUTO: 90 %
NRBC # BLD AUTO: 0 10*3/UL
NRBC BLD AUTO-RTO: 0 /100
PLATELET # BLD AUTO: 299 10E9/L (ref 150–450)
RBC # BLD AUTO: 4.77 10E12/L (ref 3.8–5.2)
WBC # BLD AUTO: 12.8 10E9/L (ref 4–11)

## 2020-02-23 PROCEDURE — 70553 MRI BRAIN STEM W/O & W/DYE: CPT

## 2020-02-23 PROCEDURE — 99217 ZZC OBSERVATION CARE DISCHARGE: CPT | Performed by: PHYSICIAN ASSISTANT

## 2020-02-23 PROCEDURE — G0378 HOSPITAL OBSERVATION PER HR: HCPCS

## 2020-02-23 PROCEDURE — 83605 ASSAY OF LACTIC ACID: CPT | Performed by: HOSPITALIST

## 2020-02-23 PROCEDURE — 36415 COLL VENOUS BLD VENIPUNCTURE: CPT | Performed by: HOSPITALIST

## 2020-02-23 PROCEDURE — 25500064 ZZH RX 255 OP 636: Performed by: HOSPITALIST

## 2020-02-23 PROCEDURE — A9585 GADOBUTROL INJECTION: HCPCS | Performed by: HOSPITALIST

## 2020-02-23 PROCEDURE — 36415 COLL VENOUS BLD VENIPUNCTURE: CPT | Performed by: PHYSICIAN ASSISTANT

## 2020-02-23 PROCEDURE — 25000132 ZZH RX MED GY IP 250 OP 250 PS 637: Performed by: PHYSICIAN ASSISTANT

## 2020-02-23 PROCEDURE — 85025 COMPLETE CBC W/AUTO DIFF WBC: CPT | Performed by: PHYSICIAN ASSISTANT

## 2020-02-23 RX ORDER — GADOBUTROL 604.72 MG/ML
15 INJECTION INTRAVENOUS ONCE
Status: COMPLETED | OUTPATIENT
Start: 2020-02-23 | End: 2020-02-23

## 2020-02-23 RX ADMIN — LITHIUM CARBONATE 600 MG: 300 TABLET, FILM COATED, EXTENDED RELEASE ORAL at 08:44

## 2020-02-23 RX ADMIN — GADOBUTROL 15 ML: 604.72 INJECTION INTRAVENOUS at 10:24

## 2020-02-23 RX ADMIN — DULOXETINE HYDROCHLORIDE 20 MG: 20 CAPSULE, DELAYED RELEASE ORAL at 08:44

## 2020-02-23 NOTE — PLAN OF CARE
PRIMARY DIAGNOSIS: TIA Rule Out  OUTPATIENT/OBSERVATION GOALS TO BE MET BEFORE DISCHARGE:  1. Orthostatic performed: N/A    2. Diagnostic testing complete & at baseline neurologic testing: Yes    3. Cleared by consultants (if involved): No    4. Interpretation of cardiac rhythm per telemetry tech: not on tele    5. Tolerating adequate PO diet and medications: Yes    6. Return to near baseline physical activity or neurologic status: Yes    Discharge Planner Nurse   Safe discharge environment identified: Yes  Barriers to discharge: Yes       Entered by: EARL NAJERA 02/23/2020      Please review provider order for any additional goals.   Nurse to notify provider when observation goals have been met and patient is ready for discharge.    Vital signs:  Temp: 97.7  F (36.5  C) Temp src: Oral BP: 120/48 Pulse: 95 Heart Rate: 97 Resp: 16 SpO2: 97 % O2 Device: None (Room air)   Alert and oriented x4. Independently moving in room. Neuro intact. Pt stated vision problem resolved and able to see clearly with their glasses. Reported their sensation to touch on the right extremity is less compared to the left extremity. Denies headache. MRI to be done this morning. Regular diet. Saline locked. Will continue to monitor.

## 2020-02-23 NOTE — PLAN OF CARE
PRIMARY DIAGNOSIS: TIA Rule Out  OUTPATIENT/OBSERVATION GOALS TO BE MET BEFORE DISCHARGE:  1. Orthostatic performed: N/A    2. Diagnostic testing complete & at baseline neurologic testing: Yes    3. Cleared by consultants (if involved): Yes    4. Interpretation of cardiac rhythm per telemetry tech: not on tele    5. Tolerating adequate PO diet and medications: Yes    6. Return to near baseline physical activity or neurologic status: Yes    Discharge Planner Nurse   Safe discharge environment identified: Yes  Barriers to discharge: Yes       Entered by: EARL NAJERA 02/23/2020      Please review provider order for any additional goals.   Nurse to notify provider when observation goals have been met and patient is ready for discharge.    Vital signs:  Temp: 100.3  F (37.9  C) Temp src: Oral BP: 123/69 Pulse: 95 Heart Rate: 94 Resp: 16 SpO2: 94 % O2 Device: None (Room air) Alert and oriented x4. Independently moving in room. Neuro intact. Pt stated vision problem resolved and able to see clearly with their glasses. Reported their sensation to touch on the right extremity is less compared to the left extremity. Denies headache. Regular diet. Saline locked. MRI result is normal. To be discharged soon. Will continue to monitor.

## 2020-02-23 NOTE — DISCHARGE SUMMARY
[facility] Outpatient / Observation Unit  Discharge Summary        Daylin Yates MRN# [id number]   YOB: 1985 Age: 34 year old     Date of Admission:  2/22/2020  Date of Discharge:  2/23/2020  Admitting Physician:  Juice Brannon MD  Discharge Physician: Jennifer Flowers PA-C, SHAHRIAR  Discharging Service: Hospitalist      Primary Provider: [facility], [place]  Primary Care Physician Phone Number: [phone]         Primary Discharge Diagnoses:    Daylin Yates is a 34 year old female with past medical history of depression, bipolar disorder, atypical neurologic symptoms who presented to the ER on 2/22/2020 with her parents complaining of weakness of right upper and lower extremity as well as double vision in her right eye that developed earlier in the day when playing with her daughter.       Patient presented to ED afebrile, VSS.  Work-up revealed glucose 132, BMP within normal limits, initial troponin under 0.015.  CBC with slight neutrophilic leukocytosis of 13.1, no anemia.  CT of the head without contrast showed no evidence of acute infarct or significant orbital abnormality.  CTA head and neck negative for significant stenosis or occlusion in the anterior or posterior circulation.  CT of the head without focal or regional perfusion deficits. EKG NSR, rate 87. The patient was amitted to observation for further monitoring.     She was monitored overnight with improvement of her symptoms. The visual changes resolved as well as the weakness in her extremities. An MRI of the brain was obtained that was also normal.      #Vague neurologic complaints  Workup has been normal this admission. Patient is already following with Neurology and Rheumatology as an outpatient for her ongoing various complaints that started 2-3 months ago.      #History of Multiple areas of Paraesthesias  #Possible Migraine Headaches  #Unspecified Vision loss  Patient has a variety of symptoms without localization to  one lesion, absence of any objective findings.  Has been seen in the outpatient setting recently by Dr. Tenorio with [facility] Neurology; Evaluated patient with MRI and connective tissue disorders which were negative per chart review.     #Leukocytosis  Patient is afebrile, nonseptic appearing and without focal symptoms.  - Repeat CBC in 1-2 weeks.      #Bipolar Disorder  #Depression  Stable per patient.         Secondary Discharge Diagnoses:     Past Medical History:   Diagnosis Date     Bipolar disorder (H) 2011    Lamictal weaned off at beginning of preg     Ectopic pregnancy 2007    MTX            Code Status:      Full Code        Brief Hospital Summary:        Reason for your hospital stay      You were registered to the observation unit for peripheral tingling, AMS   and dizziness. Your workup included labs, CT of the head and CT angiogram   of the head and neck, as well as an MRI of the brain that was all normal.   By time of discharge, your symptoms had improved and you were deemed safe   for discharge home.             Please refer to initial admission history and physical for further details.   Briefly, Daylin Yates was admitted on 2/22/2020 for complaints of                                     tingling in the arm, lower, arm, upper and lower leg, weakness in the arm on the right and weakness in the leg on the right concerning for TIA.     Initial work up in the ED revealed a negative CT scan of the head for any acute process.   EKG did not reveal evidence of significant cardiac arrhythmias or ischemia.  Pt was registered to the Observation Unit for further evaluation.     Pt was placed on telemetry and underwent frequent neuro checks.   Laboratory results were reviewed and significant findings addressed.   MRI of the brain was obtained with no evidence of acute CVA. Neurology consult was not obtained.  On the day of discharge, patient had resolution of the symptoms vitals remained stable and pt  was deemed safe for discharge. Medications were reviewed and adjustments made as necessary. Pt is instructed to follow up as below.           Significant Lab During Hospitalization:      No results for input(s): PH, PHV, PO2, PO2V, SAT, PCO2, PCO2V, HCO3, HCO3V in the last 168 hours.  Recent Labs   Lab 02/23/20  0540 02/22/20  1538   WBC 12.8* 13.1*   HGB 13.3 13.0   HCT 41.0 39.6   MCV 86 86    336          Lab Results   Component Value Date     02/22/2020     09/19/2012    Lab Results   Component Value Date    CHLORIDE 104 02/22/2020    CHLORIDE 104 09/19/2012    Lab Results   Component Value Date    BUN 8 02/22/2020    BUN 8 09/19/2012      Lab Results   Component Value Date    POTASSIUM 3.6 02/22/2020    POTASSIUM 4.2 09/19/2012    Lab Results   Component Value Date    CO2 29 02/22/2020    CO2 26 09/19/2012    Lab Results   Component Value Date    CR 0.66 02/22/2020    CR 0.74 11/10/2014    CR 0.72 09/19/2012        No results for input(s): CULT in the last 168 hours.  Recent Labs   Lab 02/22/20  1538      POTASSIUM 3.6   CHLORIDE 104   CO2 29   ANIONGAP 4   *   BUN 8   CR 0.66   GFRESTIMATED >90   GFRESTBLACK >90   SUGEY 9.2     No results for input(s): NTBNPI, NTBNP in the last 168 hours.  No results for input(s): DD in the last 168 hours.  Recent Labs   Lab 02/22/20  1538      POTASSIUM 3.6   CHLORIDE 104   CO2 29   *     Recent Labs   Lab 02/22/20  1538   INR 0.99     No results for input(s): LACT in the last 168 hours.  No results for input(s): LIPASE in the last 168 hours.  No results for input(s): CHOL, HDL, LDL, TRIG, CHOLHDLRATIO in the last 168 hours.  No results for input(s): TSH in the last 168 hours.  Recent Labs   Lab 02/22/20  1538   TROPI <0.015     No results for input(s): COLOR, APPEARANCE, URINEGLC, URINEBILI, URINEKETONE, SG, UBLD, URINEPH, PROTEIN, UROBILINOGEN, NITRITE, LEUKEST, RBCU, WBCU in the last 168 hours.             Significant Imaging  During Hospitalization:      Recent Results (from the past 48 hour(s))   CT Head w/o Contrast    Addendum: 2/22/2020    Further history is available: Easily fatigued right upper extremity.  Eye twitching. Staring spells.    YARI PETTY MD      Narrative    CT OF THE HEAD WITHOUT CONTRAST   2/22/2020 3:50 PM     COMPARISON: None.    HISTORY:  Stroke.     TECHNIQUE:  Axial CT images of the head from the skull base to the  vertex were acquired without IV contrast.    FINDINGS:   INTRACRANIAL CONTENTS: No intracranial hemorrhage, extraaxial  collection, or mass effect.  No CT evidence of acute infarct. Normal  parenchymal density for age. The ventricles and sulci are normal for  age.    VISUALIZED ORBITS/SINUSES/MASTOIDS: No significant orbital  abnormality. No significant paranasal sinus mucosal disease. No  significant middle ear or mastoid effusion.    OSSEOUS STRUCTURES/SOFT TISSUES: No significant abnormality.      Impression    IMPRESSION:  Normal for age.    Radiation dose for this scan was reduced using automated exposure  control, adjustment of the mA and/or kV according to patient size, or  iterative reconstruction technique    YARI PETTY MD   CTA Head Neck with Contrast    Narrative    HEAD CT AND CT ANGIOGRAM OF THE HEAD AND NECK WITHOUT AND WITH  CONTRAST  2/22/2020 3:51 PM     COMPARISON: None    HISTORY: Stroke    TECHNIQUE:    Precontrast localizing scans were followed by CT angiography with an  injection of 70mL Isovue-370 nonionic intravenous contrast material  with scans through the head and neck.  Images were transferred to a  separate 3-D workstation where multiplanar reformations and 3-D images  were created.  Estimates of carotid stenoses are made relative to the  distal internal carotid artery diameters except as noted.      FINDINGS:   HEAD CTA:  ANTERIOR CIRCULATION: No significant stenosis or occlusion. Standard  [place] anatomy.    POSTERIOR CIRCULATION: No significant  stenosis or occlusion. Balanced  vertebral arteries supply a normal basilar artery.    ANEURYSM/VASCULAR MALFORMATION: None.    NECK CTA:  RIGHT CAROTID: No measurable stenosis in the right ICA based on NASCET  criteria.    LEFT CAROTID: No measurable stenosis in the left ICA based on NASCET  criteria.     VERTEBRAL ARTERIES: Balanced vertebral arteries are patent in the neck  and into the head.     AORTIC ARCH: Classic aortic arch anatomy with no significant stenosis  at the origin of the great vessels.      Impression    IMPRESSION:  HEAD CTA:  1.  Normal head CTA.  2.  No significant stenosis.  No aneurysm or vascular malformation.    NECK CTA:  1.  Normal neck CTA  for age.  2.  No significant stenosis as per NASCET criteria.    Radiation dose for this scan was reduced using automated exposure  control, adjustment of the mA and/or kV according to patient size, or  iterative reconstruction technique    YARI PETTY MD   CT Head Perfusion w Contrast    Narrative    CT BRAIN PERFUSION 2/22/2020 3:54 PM    COMPARISON: None    HISTORY: Stroke    TECHNIQUE: Time sequential axial CT images of the head were acquired  during the administration of intravenous contrast (50mL Isovue-370).  CTA images of the [place] as well as color perfusion maps of  the brain were created from this time sequential axial source data.    FINDINGS: There are no focal or regional perfusion defects in the  brain.      Impression    IMPRESSION: Normal CT perfusion of the brain.      Radiation dose for this scan was reduced using automated exposure  control, adjustment of the mA and/or kV according to patient size, or  iterative reconstruction technique    YARI PETTY MD    Head CT, head CTA, neck CTA and CT perfusion findings were discussed  with Dr. Pabon at 4:10 PM hours on 2/22/2020.   MR Brain w/o & w Contrast    Narrative    MRI BRAIN WITHOUT AND WITH CONTRAST  2/23/2020 10:56 AM     HISTORY: Right lower extremity and arm  weakness, history of atypical  paresthesias and unspecified vision changes.     TECHNIQUE: Multiplanar, multisequence MRI of the brain without and  with    COMPARISON: None.     FINDINGS: There is no evidence of acute infarct, hemorrhage, mass, or  herniation. The brain parenchyma, ventricles and subarachnoid spaces  appear normal. Normal brainstem and cerebellum. White matter tracts  unremarkable. Normal craniocervical junction, orbits and sella.  Slightly decreased marrow T1 signal within the upper C-spine is  nonspecific and could represent hematopoietic marrow. No focal  lesions.    There is no abnormal intracranial enhancement.     Minimal membrane thickening in the maxillary and ethmoid sinuses  bilaterally. No air-fluid levels. The other paranasal sinuses and  mastoids appear normal. The major arterial T2 flow voids at the base  of the brain appear patent.       Impression    IMPRESSION: Normal MRI of the brain.      HERON WILEY MD              Pending Results:        Unresulted Labs Ordered in the Past 30 Days of this Admission     No orders found for last 31 day(s).              Consultations This Hospital Stay:      No consultations were requested during this admission         Discharge Instructions and Follow-Up:      Follow-up Appointments     Follow-up and recommended labs and tests       Schedule a follow up appointment with your outpatient Neurologist within   the next 1-2 weeks.   Follow up with your family doctor within the next 2-4 weeks to discuss   your recent admission.   Follow up with your Rheumatologist within the next 2-4 weeks.           Pt instructed to follow up with PCP or Neurologist within 2-3 days of discharge.    Follow-up Labs CBC        Discharge Disposition:      Discharged to home         Discharge Medications:        Current Discharge Medication List      CONTINUE these medications which have NOT CHANGED    Details   DULoxetine (CYMBALTA) 20 MG capsule Take 20 mg by  mouth 2 times daily      lithium ER (LITHOBID) 300 MG CR tablet Take 600 mg by mouth 2 times daily      multivitamin, therapeutic (THERA-VIT) TABS tablet Take 1 tablet by mouth daily      levonorgestrel (MIRENA) 20 MCG/24HR IUD 1 each (20 mcg) by Intrauterine route once for 1 dose  Qty: 1 each, Refills: 0    Associated Diagnoses: Encounter for IUD insertion      levonorgestrel (MIRENA) 20 MCG/24HR IUD 1 each (20 mcg) by Intrauterine route once for 1 dose  Qty: 1 each, Refills: 0    Associated Diagnoses: Encounter for IUD insertion; Presence of intrauterine contraceptive device (IUD); Contraception      triamcinolone (KENALOG) 0.1 % external cream Apply topically 2 times daily as needed for irritation (eczema)                 Allergies:         Allergies   Allergen Reactions     Penicillins Anaphylaxis     Sulfa Drugs Hives     Latex Hives           Condition and Physical on Discharge:      Discharge condition: Stable   Vitals: Blood pressure 120/48, pulse 95, temperature 97.7  F (36.5  C), temperature source Oral, resp. rate 16, weight 115.2 kg (254 lb), SpO2 97 %, currently breastfeeding.  254 lbs 0 oz      GENERAL:  Comfortable.  PSYCH: pleasant, oriented, No acute distress.  HEENT:  PERRLA. Normal conjunctiva, normal hearing, nasal mucosa and Oropharynx are normal.  NECK:  Supple, no neck vein distention, adenopathy or bruits, normal thyroid.  HEART:  Normal S1, S2 with no murmur, no pericardial rub, gallops or S3 or S4.  LUNGS:  Clear to auscultation, normal Respiratory effort. No wheezing, rales or ronchi.  ABDOMEN:  Soft, no hepatosplenomegaly, normal bowel sounds. Non-tender, non distended.   EXTREMITIES:  No pedal edema, +2 pulses bilateral and equal.  SKIN:  Dry to touch, No rash, wound or ulcerations.  NEUROLOGIC:  CN 2-12 intact, BL 5/5 symmetric upper and lower extremity strength, sensation is intact with no focal deficits.

## 2020-02-23 NOTE — PLAN OF CARE
Patient's After Visit Summary was reviewed with patient.   Patient verbalized understanding of After Visit Summary, recommended follow up and was given an opportunity to ask questions.   Discharge medications sent home with patient/family: No   Discharged with mother      VSS. Declined wheelchair ride.

## 2020-02-23 NOTE — PLAN OF CARE
PRIMARY DIAGNOSIS: TIA r/o   OUTPATIENT/OBSERVATION GOALS TO BE MET BEFORE DISCHARGE:  1. Orthostatic performed: N/A    2. Diagnostic testing complete & at baseline neurologic testing: Not yet- MRI to be completed     3. Cleared by consultants (if involved): No- Neurotology to see     4. Interpretation of cardiac rhythm per telemetry tech: None ordered    5. Tolerating adequate PO diet and medications: Yes    6. Return to near baseline physical activity or neurologic status: Yes    Discharge Planner Nurse   Safe discharge environment identified: Yes  Barriers to discharge: Yes       Entered by: Savana Griffin 02/22/2020 9:04 PM     Vitally stable, A&Ox4. Neuros intact, continues to have numbness/weakness to R arm/hand and some visual disturbances but denies tunnel vision. Pt reporting weakness improving. Independent in room. MRI to be completed. CT/CTA done, see results. Plan for neurology consult in AM. Will continue to monitor.     Please review provider order for any additional goals.   Nurse to notify provider when observation goals have been met and patient is ready for discharge.

## 2020-02-23 NOTE — PLAN OF CARE
ROOM # 233    Living Situation (if not independent, order SW consult): lives with parents and daughter in [facility]   Facility name: N/A  : Savana Yates (mother)    Activity level at baseline: independent  Activity level on admit: independent      Patient registered to observation; given Patient Bill of Rights; given the opportunity to ask questions about observation status and their plan of care.  Patient has been oriented to the observation room, bathroom and call light is in place.    Discussed discharge goals and expectations with patient/family.

## 2020-02-23 NOTE — PLAN OF CARE
PRIMARY DIAGNOSIS: TIA r/o   OUTPATIENT/OBSERVATION GOALS TO BE MET BEFORE DISCHARGE:  1. Orthostatic performed: N/A    2. Diagnostic testing complete & at baseline neurologic testing: Not yet- MRI to be completed     3. Cleared by consultants (if involved): No- Neurotology to see     4. Interpretation of cardiac rhythm per telemetry tech: None ordered    5. Tolerating adequate PO diet and medications: Yes    6. Return to near baseline physical activity or neurologic status: Yes    Discharge Planner Nurse   Safe discharge environment identified: Yes  Barriers to discharge: Yes       Entered by: Savana Griffin 02/23/2020 2:05 AM     A&Ox4. Neuros intact, continues to have numbness/weakness to R arm/hand and some visual disturbances. Independent in room. MRI to be completed. Plan for neurology consult in AM. Will continue to monitor.     Please review provider order for any additional goals.   Nurse to notify provider when observation goals have been met and patient is ready for discharge.

## 2020-02-23 NOTE — PLAN OF CARE
PRIMARY DIAGNOSIS: TIA r/o   OUTPATIENT/OBSERVATION GOALS TO BE MET BEFORE DISCHARGE:  1. Orthostatic performed: N/A    2. Diagnostic testing complete & at baseline neurologic testing: Not yet- MRI to be completed     3. Cleared by consultants (if involved): No- Neurotology to see     4. Interpretation of cardiac rhythm per telemetry tech: None ordered    5. Tolerating adequate PO diet and medications: Yes    6. Return to near baseline physical activity or neurologic status: Yes    Discharge Planner Nurse   Safe discharge environment identified: Yes  Barriers to discharge: Yes       Entered by: Savana Griffin 02/23/2020 5:04 AM     A&Ox4. Neuros intact, continues to have numbness/weakness to R arm/hand and some visual disturbances. Independent in room. MRI to be completed. Plan for neurology consult in AM. Will continue to monitor.     Please review provider order for any additional goals.   Nurse to notify provider when observation goals have been met and patient is ready for discharge.

## 2020-03-12 ENCOUNTER — HOSPITAL ENCOUNTER (EMERGENCY)
Facility: CLINIC | Age: 35
Discharge: HOME | End: 2020-03-12
Attending: PHYSICIAN ASSISTANT | Admitting: PHYSICIAN ASSISTANT
Payer: COMMERCIAL

## 2020-03-12 VITALS
RESPIRATION RATE: 14 BRPM | SYSTOLIC BLOOD PRESSURE: 112 MMHG | OXYGEN SATURATION: 99 % | HEART RATE: 100 BPM | DIASTOLIC BLOOD PRESSURE: 62 MMHG | TEMPERATURE: 97.3 F

## 2020-03-12 DIAGNOSIS — R51.9 ACUTE NONINTRACTABLE HEADACHE, UNSPECIFIED HEADACHE TYPE: ICD-10-CM

## 2020-03-12 LAB
ANION GAP SERPL CALCULATED.3IONS-SCNC: 4 MMOL/L (ref 3–14)
BASOPHILS # BLD AUTO: 0 10E9/L (ref 0–0.2)
BASOPHILS NFR BLD AUTO: 0.3 %
BUN SERPL-MCNC: 9 MG/DL (ref 7–30)
CALCIUM SERPL-MCNC: 9.5 MG/DL (ref 8.5–10.1)
CHLORIDE SERPL-SCNC: 104 MMOL/L (ref 94–109)
CO2 SERPL-SCNC: 29 MMOL/L (ref 20–32)
CREAT SERPL-MCNC: 0.71 MG/DL (ref 0.52–1.04)
DIFFERENTIAL METHOD BLD: ABNORMAL
EOSINOPHIL # BLD AUTO: 0.1 10E9/L (ref 0–0.7)
EOSINOPHIL NFR BLD AUTO: 0.6 %
ERYTHROCYTE [DISTWIDTH] IN BLOOD BY AUTOMATED COUNT: 12.8 % (ref 10–15)
GFR SERPL CREATININE-BSD FRML MDRD: >90 ML/MIN/{1.73_M2}
GLUCOSE SERPL-MCNC: 96 MG/DL (ref 70–99)
HCT VFR BLD AUTO: 41.6 % (ref 35–47)
HGB BLD-MCNC: 13.7 G/DL (ref 11.7–15.7)
IMM GRANULOCYTES # BLD: 0.1 10E9/L (ref 0–0.4)
IMM GRANULOCYTES NFR BLD: 0.6 %
LYMPHOCYTES # BLD AUTO: 1.6 10E9/L (ref 0.8–5.3)
LYMPHOCYTES NFR BLD AUTO: 11.5 %
MCH RBC QN AUTO: 28.5 PG (ref 26.5–33)
MCHC RBC AUTO-ENTMCNC: 32.9 G/DL (ref 31.5–36.5)
MCV RBC AUTO: 87 FL (ref 78–100)
MONOCYTES # BLD AUTO: 0.6 10E9/L (ref 0–1.3)
MONOCYTES NFR BLD AUTO: 4.3 %
NEUTROPHILS # BLD AUTO: 11.8 10E9/L (ref 1.6–8.3)
NEUTROPHILS NFR BLD AUTO: 82.7 %
NRBC # BLD AUTO: 0 10*3/UL
NRBC BLD AUTO-RTO: 0 /100
PLATELET # BLD AUTO: 357 10E9/L (ref 150–450)
POTASSIUM SERPL-SCNC: 3.6 MMOL/L (ref 3.4–5.3)
RBC # BLD AUTO: 4.8 10E12/L (ref 3.8–5.2)
SODIUM SERPL-SCNC: 137 MMOL/L (ref 133–144)
WBC # BLD AUTO: 14.2 10E9/L (ref 4–11)

## 2020-03-12 PROCEDURE — 96374 THER/PROPH/DIAG INJ IV PUSH: CPT

## 2020-03-12 PROCEDURE — 80048 BASIC METABOLIC PNL TOTAL CA: CPT | Performed by: PHYSICIAN ASSISTANT

## 2020-03-12 PROCEDURE — 96375 TX/PRO/DX INJ NEW DRUG ADDON: CPT

## 2020-03-12 PROCEDURE — 85025 COMPLETE CBC W/AUTO DIFF WBC: CPT | Performed by: PHYSICIAN ASSISTANT

## 2020-03-12 PROCEDURE — 25000128 H RX IP 250 OP 636: Performed by: PHYSICIAN ASSISTANT

## 2020-03-12 PROCEDURE — 96361 HYDRATE IV INFUSION ADD-ON: CPT

## 2020-03-12 PROCEDURE — 99284 EMERGENCY DEPT VISIT MOD MDM: CPT | Mod: 25

## 2020-03-12 PROCEDURE — 25800030 ZZH RX IP 258 OP 636: Performed by: PHYSICIAN ASSISTANT

## 2020-03-12 RX ORDER — METOCLOPRAMIDE HYDROCHLORIDE 5 MG/ML
10 INJECTION INTRAMUSCULAR; INTRAVENOUS ONCE
Status: COMPLETED | OUTPATIENT
Start: 2020-03-12 | End: 2020-03-12

## 2020-03-12 RX ORDER — DIPHENHYDRAMINE HYDROCHLORIDE 50 MG/ML
25 INJECTION INTRAMUSCULAR; INTRAVENOUS ONCE
Status: COMPLETED | OUTPATIENT
Start: 2020-03-12 | End: 2020-03-12

## 2020-03-12 RX ORDER — KETOROLAC TROMETHAMINE 15 MG/ML
15 INJECTION, SOLUTION INTRAMUSCULAR; INTRAVENOUS ONCE
Status: COMPLETED | OUTPATIENT
Start: 2020-03-12 | End: 2020-03-12

## 2020-03-12 RX ADMIN — DIPHENHYDRAMINE HYDROCHLORIDE 25 MG: 50 INJECTION, SOLUTION INTRAMUSCULAR; INTRAVENOUS at 14:50

## 2020-03-12 RX ADMIN — SODIUM CHLORIDE 1000 ML: 9 INJECTION, SOLUTION INTRAVENOUS at 14:48

## 2020-03-12 RX ADMIN — METOCLOPRAMIDE 10 MG: 5 INJECTION, SOLUTION INTRAMUSCULAR; INTRAVENOUS at 14:50

## 2020-03-12 RX ADMIN — KETOROLAC TROMETHAMINE 15 MG: 15 INJECTION, SOLUTION INTRAMUSCULAR; INTRAVENOUS at 14:50

## 2020-03-12 ASSESSMENT — ENCOUNTER SYMPTOMS
FEVER: 0
CHILLS: 1
HEADACHES: 1

## 2020-03-12 NOTE — ED TRIAGE NOTES
Had LP on Tuesday.  Headache and right hip pain started at 4pm yesterday.  ABCDS intact.  Called neurology clinic, told to come in.

## 2020-03-12 NOTE — ED PROVIDER NOTES
"  History     Chief Complaint:  Headache     HPI   Daylin Yates is a 34 year old female with a history of chronic migraines who presents to the emergency department today for evaluation of a headache. Per chart review, the patient underwent an [place] lumbar puncture (Dr. Madhu Tenorio MD) to evaluate for paresthesias and hemiparesis on 3/10/2020. The patient reports that she developed a dull headache last night that transitioned to the \"world's worst headache\" over the course of 2-3 hours and has persisted since. Given the development of chills and a temperature drop, she presents this morning for evaluation and treatment. Here the patient endorses utilizing ibuprofen with no relief. She denies any fever. She denies new neurologic symptoms including new weakness or numbness.     Allergies:  Penicillin  Sulfa Drugs  Latex     Medications:    Cymbalta  Mirena  Lithobid    Past Medical History:    Bipolar disorder  Ectopic pregnancy  Obesity  Hemiparesis  Paresthesia  Tremor  Chronic migraine  Vision changes  Psoriasis  Fibromyalgia     Past Surgical History:    Arthroscopy knee  Wrist arthroscop, clean/drain  EGD  Podiatry/foot and ankle surgery   [place] teeth extraction    Family History:    Sister: diabetes  Father: arthritis  Sister: anxiety, depression, diabetes    Social History:  The patient was accompanied to the ED by a female .  Smoking Status: Never Smoker  Smokeless Tobacco: Never Used  Alcohol Use: Positive  Drug Use: Negative  PCP: [facility], [place]  Marital Status:  Single      Review of Systems   Constitutional: Positive for chills. Negative for fever.        Decreased temperature   Neurological: Positive for headaches.   All other systems reviewed and are negative.      Physical Exam     Patient Vitals for the past 24 hrs:   BP Temp Temp src Pulse Resp SpO2   03/12/20 1700 112/62 -- -- -- 14 99 %   03/12/20 1558 119/66 -- -- 100 16 100 %   03/12/20 1329 (!) 144/101 97.3  F (36.3 "  C) Temporal 84 16 99 %     Physical Exam  General: Alert, interactive.   Head:  Scalp is atraumatic.  Eyes:  EOM intact. The pupils are equal, round, and reactive to light. No scleral icterus.  ENT:                                      Ears:  The external ears are normal.  Nose:  The external nose is normal.  Throat:  The oropharynx is normal. Mucus membranes are moist.                 Neck:  Normal range of motion. There is no rigidity.   CV:  Regular rate and rhythm. No murmur. 2+ radial pulses  Resp:  Breath sounds are clear bilaterally. Non-labored, no retractions or accessory muscle use.  GI:  Abdomen is soft, no distension, no tenderness.   MS:  Normal range of motion. No midline cervical, thoracic, lumbar tenderness. No overlying erythema or swelling to the lumbar area.   Skin:  Warm and dry.   Neuro:  Cranial nerves 2-12 intact; decrease strength to right upper extremity compared to left (chronic per patient). Strength equal to BLE. sensation intact to light touch throughout the upper and lower extremities;   normal gait, No meningismus   Psych:  Awake. Alert & orientedx3.  Appropriate interactions.     Emergency Department Course     Laboratory:  Laboratory findings were communicated with the patient who voiced understanding of the findings.    CBC: WBC 14.2 (H), HGB 13.7,   BMP: WNL (Creatinine 0.71)    Interventions:  1448 NS 1000 ml IV  1450 Reglan 10 mg IV  1450 Benadryl 25 mg IV  1450 Toradol 15 mg IV    Emergency Department Course:    1347 Nursing notes and vitals reviewed.    1351 I performed an exam of the patient as documented above.     1602 I spoke with Dr. Tenorio of the neurology service from [facility] regarding patient's presentation, findings, and plan of care.    1610 Patient rechecked and updated.  Patient reported feeling improved and felt comfortable with discharge.     Findings and plan explained to the patient. Patient discharged home with instructions regarding supportive  care, medications, and reasons to return. The importance of close follow-up was reviewed.     Impression & Plan      Medical Decision Making:  Daylin Yates is a 34 year old female who presents for evaluation of gradual onset headache after LP 2 days ago.  The patient was seen on 3/10/2020 and had an LP at that time for RUE weakness. She voices no other concerns beside headache and chills last night. Patient is afebrile here and lumbar area without sign of infection. Unclear source of leukocytosis and emphasized importance of returning to the ED should fever develop, increase low back pain, or neck stiffness. No sign of bacterial infection at this time.  The patient was given fluids, Reglan, benadryl, and Toradol with improvement in her pain. I discussed the case with Dr. Tenorio, who conducted the LP, and he stated that she has a blood patch scheduled for tomorrow morning and if pain controlled can follow up tomorrow for this procedure.  I do not have high clinical suspicion for other etiology for the patient's headache as it is classic for post dural puncture headache. Return precautions discussed. Patient discharged in improved condition.      Diagnosis:    ICD-10-CM    1. Acute nonintractable headache, unspecified headache type  R51      Disposition:   The patient is discharged to home.    Scribe Disclosure:  I, Cassandra Owens, am serving as a scribe at 2:13 PM on 3/12/2020 to document services personally performed by Rosio Burgos PA-C based on my observations and the provider's statements to me.    [facility] EMERGENCY DEPARTMENT       Rosio Burgos PA-C  03/13/20 0942

## 2020-03-12 NOTE — DISCHARGE INSTRUCTIONS
*You may resume diet and activities as tolerated.  Drink plenty of fluids and get plenty of rest.  *Follow-up at your appointment tomorrow morning.   *Return if you develop fever, neck stiffness, sudden onset headache, numbness, weakness, problems with speech/vision/coordination, faint or feel like you will faint or become worse in any way.       Discharge Instructions  Headache    You were seen today for a headache. Headaches may be caused by many different things such as muscle tension, sinus inflammation, anxiety and stress, having too little sleep, too much alcohol, some medical conditions or injury. You may have a migraine, which is caused by changes in the blood vessels in your head.  At this time your provider does not find that your headache is a sign of anything dangerous or life-threatening.  However, sometimes the signs of serious illness do not show up right away.      Generally, every Emergency Department visit should have a follow-up clinic visit with either a primary or a specialty clinic/provider. Please follow-up as instructed by your emergency provider today.    Return to the Emergency Department if:  You get a new fever of 100.4 F or higher.  Your headache gets much worse.  You get a stiff neck with your headache.  You get a new headache that is significantly different or worse than headaches you have had before.  You are vomiting (throwing up) and cannot keep food or water down.  You have blurry or double vision or other problems with your eyes.  You have a new weakness on one side of your body.  You have difficulty with balance which is new.  You or your family thinks you are confused.  You have a seizure.    What can I do to help myself?  Pain medications - You may take a pain medication such as Tylenol  (acetaminophen), Advil , Motrin  (ibuprofen) or Aleve  (naproxen).  Take a pain reliever as soon as you notice symptoms.  Starting medications as soon as you start to have symptoms may lessen  the amount of pain you have.  Relaxing in a quiet, dark room may help.  Get enough sleep and eat meals regularly.  You may need to watch for certain foods or other things which may trigger your headaches.  Keeping a journal of your headaches and possible triggers may help you and your primary provider to identify things which you should avoid which may be causing your headaches.  If you were given a prescription for medicine here today, be sure to read all of the information (including the package insert) that comes with your prescription.  This will include important information about the medicine, its side effects, and any warnings that you need to know about.  The pharmacist who fills the prescription can provide more information and answer questions you may have about the medicine.  If you have questions or concerns that the pharmacist cannot address, please call or return to the Emergency Department.   Remember that you can always come back to the Emergency Department if you are not able to see your regular provider in the amount of time listed above, if you get any new symptoms, or if there is anything that worries you.

## 2020-03-12 NOTE — ED AVS SNAPSHOT
[facility] Emergency Department  [address]  [facility] [address]  Phone:  [phone]  Fax:  [phone]                                    Daylin Yates   MRN: [id number]    Department:  [facility] Emergency Department   Date of Visit:  3/12/2020           After Visit Summary Signature Page    I have received my discharge instructions, and my questions have been answered. I have discussed any challenges I see with this plan with the nurse or doctor.    ..........................................................................................................................................  Patient/Patient Representative Signature      ..........................................................................................................................................  Patient Representative Print Name and Relationship to Patient    ..................................................               ................................................  Date                                   Time    ..........................................................................................................................................  Reviewed by Signature/Title    ...................................................              ..............................................  Date                                               Time          22EPIC Rev 08/18

## 2021-07-20 ENCOUNTER — HOSPITAL ENCOUNTER (EMERGENCY)
Facility: CLINIC | Age: 36
Discharge: HOME | End: 2021-07-20
Attending: PHYSICIAN ASSISTANT | Admitting: PHYSICIAN ASSISTANT
Payer: COMMERCIAL

## 2021-07-20 ENCOUNTER — APPOINTMENT (OUTPATIENT)
Dept: ULTRASOUND IMAGING | Facility: CLINIC | Age: 36
End: 2021-07-20
Attending: PHYSICIAN ASSISTANT
Payer: COMMERCIAL

## 2021-07-20 VITALS
TEMPERATURE: 97 F | BODY MASS INDEX: 33.49 KG/M2 | OXYGEN SATURATION: 100 % | RESPIRATION RATE: 18 BRPM | HEIGHT: 68 IN | HEART RATE: 94 BPM | DIASTOLIC BLOOD PRESSURE: 89 MMHG | SYSTOLIC BLOOD PRESSURE: 124 MMHG | WEIGHT: 221 LBS

## 2021-07-20 DIAGNOSIS — M79.89 LEFT LEG SWELLING: ICD-10-CM

## 2021-07-20 PROCEDURE — 93971 EXTREMITY STUDY: CPT | Mod: LT

## 2021-07-20 PROCEDURE — 99284 EMERGENCY DEPT VISIT MOD MDM: CPT | Mod: 25

## 2021-07-20 ASSESSMENT — MIFFLIN-ST. JEOR: SCORE: 1755.95

## 2021-07-20 ASSESSMENT — ENCOUNTER SYMPTOMS: SHORTNESS OF BREATH: 0

## 2021-07-21 NOTE — ED TRIAGE NOTES
Here for concern of left lower leg swelling and behind the knee swelling started yesterday and getting worse. Denies any fall or injuries. ABCs intact.

## 2021-07-21 NOTE — ED PROVIDER NOTES
"  History   Chief Complaint:  Leg Swelling     HPI   Daylin Yates is a 35 year old female who presents with leg swelling. Yesterday, the patient felt an occasional feeling of fullness as though someone was \"injecting\" something behind her left knee. This feeling became more frequent today. This evening, while she was exercising, she began to experience left leg pain and noticed her left leg began to become swollen. She has not had any recent injuries. She denies any chest pain or shortness of breath. Of note, she did have a stroke last year. She does not have a history of blood clots.     Review of Systems   Respiratory: Negative for shortness of breath.    Cardiovascular: Positive for leg swelling. Negative for chest pain.   Musculoskeletal:        Left leg pain  Feeling of fullness behind left knee   All other systems reviewed and are negative.    Allergies:  Penicillins  Sulfa Drugs  Latex    Medications:  Cymbalta  Levonorgestrel  Lithobid  Kenalog    Past Medical History:    IUD  Bipolar disorder  Ectopic pregnancy  Bipolar 1 disorder  Obesity  Fibromyalgia  Psoriasis  Chronic migraine  Lesion of right ulnar nerve  Cerebral artery occlusion    Past Surgical History:    Arthroscopy, knee  Arthroscopy, wrist  EGD, combined  [place] teeth    Family History:    Sister: Diabetes, Anxiety, Depression  Father: Arthritis, A-fib  Mother: Stroke    Social History:  The patient was unaccompanied to the ED.    Physical Exam     Patient Vitals for the past 24 hrs:   BP Temp Temp src Pulse Resp SpO2 Height Weight   07/20/21 2104 124/89 97  F (36.1  C) Temporal 94 18 100 % 1.727 m (5' 8\") 100.2 kg (221 lb)       Physical Exam  Constitutional: alert, cooperative.   CV: 2+ DP and PT pulses, brisk distal cap refill  Pulm: No respiratory distress  MSK: no calf tenderness. Mild left lower leg swelling compared to right. No erythema.   Neurological: Alert, attentive  5/5 strength to the DF an PF motor functions; sensation intact. "   Skin: Skin is warm and dry.   Psych: Normal mood and affect     Emergency Department Course   Imaging:  US Lower Extremity Venous Duplex Left  1.  No deep venous thrombosis in the left lower extremity.  Reading per radiology    Emergency Department Course:  Reviewed:  I reviewed nursing notes, vitals, past medical history and care everywhere    Assessments:  ED Course as of Jul 20 2238   Tue Jul 20, 2021 2210 The patient was examined and their history was obtained as noted above.      2229 Rechecked and updated patient.         Disposition:  The patient was discharged to home.     Impression & Plan   Medical Decision Making:  Daylin Yates is a 35 year old female presents emergency department with left lower leg swelling this evening.  Ultrasound without evidence of DVT.  There is no evidence of cellulitis, compartment syndrome, and no recent trauma.  Discussed possibility of Baker's cyst rupture.  Recommended elevation and close follow-up with primary care provider if symptoms persist.  Return to the emergency department for worsening pain, redness, or any other concerning symptoms develop.  There is no chest pain or shortness of breath to suggest pulmonary embolism.    Diagnosis:    ICD-10-CM    1. Left leg swelling  M79.89      Scribe Disclosure:  I, Erickson Velazquez, am serving as a scribe at 10:09 PM on 7/20/2021 to document services personally performed by Rosio Burgos PA-C based on my observations and the provider's statements to me.      Rosio Burgos PA-C  07/21/21 0011

## 2022-02-16 ENCOUNTER — HOSPITAL ENCOUNTER (EMERGENCY)
Facility: CLINIC | Age: 37
Discharge: HOME | End: 2022-02-16
Attending: EMERGENCY MEDICINE | Admitting: EMERGENCY MEDICINE
Payer: COMMERCIAL

## 2022-02-16 ENCOUNTER — APPOINTMENT (OUTPATIENT)
Dept: MRI IMAGING | Facility: CLINIC | Age: 37
End: 2022-02-16
Attending: EMERGENCY MEDICINE
Payer: COMMERCIAL

## 2022-02-16 VITALS
RESPIRATION RATE: 18 BRPM | TEMPERATURE: 98.3 F | OXYGEN SATURATION: 100 % | HEART RATE: 87 BPM | SYSTOLIC BLOOD PRESSURE: 167 MMHG | DIASTOLIC BLOOD PRESSURE: 96 MMHG

## 2022-02-16 DIAGNOSIS — R51.9 ACUTE INTRACTABLE HEADACHE, UNSPECIFIED HEADACHE TYPE: ICD-10-CM

## 2022-02-16 DIAGNOSIS — R42 DIZZINESS: ICD-10-CM

## 2022-02-16 LAB
ANION GAP SERPL CALCULATED.3IONS-SCNC: 2 MMOL/L (ref 3–14)
BASOPHILS # BLD AUTO: 0.1 10E3/UL (ref 0–0.2)
BASOPHILS NFR BLD AUTO: 1 %
BUN SERPL-MCNC: 7 MG/DL (ref 7–30)
CALCIUM SERPL-MCNC: 9.3 MG/DL (ref 8.5–10.1)
CHLORIDE BLD-SCNC: 110 MMOL/L (ref 94–109)
CO2 SERPL-SCNC: 30 MMOL/L (ref 20–32)
CREAT SERPL-MCNC: 0.73 MG/DL (ref 0.52–1.04)
EOSINOPHIL # BLD AUTO: 0.1 10E3/UL (ref 0–0.7)
EOSINOPHIL NFR BLD AUTO: 1 %
ERYTHROCYTE [DISTWIDTH] IN BLOOD BY AUTOMATED COUNT: 12.4 % (ref 10–15)
GFR SERPL CREATININE-BSD FRML MDRD: >90 ML/MIN/1.73M2
GLUCOSE BLD-MCNC: 96 MG/DL (ref 70–99)
HCT VFR BLD AUTO: 39.4 % (ref 35–47)
HGB BLD-MCNC: 12.7 G/DL (ref 11.7–15.7)
HOLD SPECIMEN: NORMAL
HOLD SPECIMEN: NORMAL
IMM GRANULOCYTES # BLD: 0 10E3/UL
IMM GRANULOCYTES NFR BLD: 0 %
LITHIUM SERPL-SCNC: 0.8 MMOL/L
LYMPHOCYTES # BLD AUTO: 2.8 10E3/UL (ref 0.8–5.3)
LYMPHOCYTES NFR BLD AUTO: 26 %
MCH RBC QN AUTO: 28.8 PG (ref 26.5–33)
MCHC RBC AUTO-ENTMCNC: 32.2 G/DL (ref 31.5–36.5)
MCV RBC AUTO: 89 FL (ref 78–100)
MONOCYTES # BLD AUTO: 0.7 10E3/UL (ref 0–1.3)
MONOCYTES NFR BLD AUTO: 6 %
NEUTROPHILS # BLD AUTO: 7.3 10E3/UL (ref 1.6–8.3)
NEUTROPHILS NFR BLD AUTO: 66 %
NRBC # BLD AUTO: 0 10E3/UL
NRBC BLD AUTO-RTO: 0 /100
PLATELET # BLD AUTO: 362 10E3/UL (ref 150–450)
POTASSIUM BLD-SCNC: 3.4 MMOL/L (ref 3.4–5.3)
RBC # BLD AUTO: 4.41 10E6/UL (ref 3.8–5.2)
SODIUM SERPL-SCNC: 142 MMOL/L (ref 133–144)
WBC # BLD AUTO: 11.1 10E3/UL (ref 4–11)

## 2022-02-16 PROCEDURE — 93005 ELECTROCARDIOGRAM TRACING: CPT

## 2022-02-16 PROCEDURE — 255N000002 HC RX 255 OP 636: Performed by: EMERGENCY MEDICINE

## 2022-02-16 PROCEDURE — 36415 COLL VENOUS BLD VENIPUNCTURE: CPT | Performed by: EMERGENCY MEDICINE

## 2022-02-16 PROCEDURE — 80201 ASSAY OF TOPIRAMATE: CPT | Performed by: EMERGENCY MEDICINE

## 2022-02-16 PROCEDURE — 70553 MRI BRAIN STEM W/O & W/DYE: CPT

## 2022-02-16 PROCEDURE — 250N000013 HC RX MED GY IP 250 OP 250 PS 637: Performed by: EMERGENCY MEDICINE

## 2022-02-16 PROCEDURE — 80048 BASIC METABOLIC PNL TOTAL CA: CPT | Performed by: EMERGENCY MEDICINE

## 2022-02-16 PROCEDURE — 99285 EMERGENCY DEPT VISIT HI MDM: CPT | Mod: 25

## 2022-02-16 PROCEDURE — 80178 ASSAY OF LITHIUM: CPT | Performed by: EMERGENCY MEDICINE

## 2022-02-16 PROCEDURE — 85025 COMPLETE CBC W/AUTO DIFF WBC: CPT | Performed by: EMERGENCY MEDICINE

## 2022-02-16 PROCEDURE — A9585 GADOBUTROL INJECTION: HCPCS | Performed by: EMERGENCY MEDICINE

## 2022-02-16 RX ORDER — GADOBUTROL 604.72 MG/ML
10 INJECTION INTRAVENOUS ONCE
Status: COMPLETED | OUTPATIENT
Start: 2022-02-16 | End: 2022-02-16

## 2022-02-16 RX ORDER — TOPIRAMATE 25 MG/1
25 TABLET, FILM COATED ORAL 2 TIMES DAILY
Qty: 60 TABLET | Refills: 0 | Status: SHIPPED | OUTPATIENT
Start: 2022-02-16 | End: 2022-03-18

## 2022-02-16 RX ORDER — ACETAMINOPHEN 500 MG
1000 TABLET ORAL ONCE
Status: COMPLETED | OUTPATIENT
Start: 2022-02-16 | End: 2022-02-16

## 2022-02-16 RX ADMIN — ACETAMINOPHEN 1000 MG: 500 TABLET, FILM COATED ORAL at 20:16

## 2022-02-16 RX ADMIN — GADOBUTROL 10 ML: 604.72 INJECTION INTRAVENOUS at 18:40

## 2022-02-16 ASSESSMENT — ENCOUNTER SYMPTOMS
CHILLS: 0
WEAKNESS: 0
VOMITING: 0
DIZZINESS: 1
HEADACHES: 1
SHORTNESS OF BREATH: 0
ABDOMINAL PAIN: 0
NAUSEA: 1
DIARRHEA: 0
CONFUSION: 1
BLOOD IN STOOL: 0
COUGH: 0
CONSTIPATION: 0
SPEECH DIFFICULTY: 0
FEVER: 0

## 2022-02-16 NOTE — ED TRIAGE NOTES
"Pt on Topamax. Has been feeling \"weird\" over the last week. Last night began feeling dizzy, and fell over. No LOC. Has had nausea and shakiness. Pt filled prescription at beginning of month and noticed it is a different . Pt believes she is in Topamax withdrawal.   "

## 2022-02-16 NOTE — ED PROVIDER NOTES
History   Chief Complaint:  Medication Reaction       The history is provided by the patient.      Daylin Yates is a 36 year old female with history of obesity, bipolar disorder, chronic migraines, cerebral artery occlusion, hemiparesis, and paresthesia who presents with a reported medicine reaction. The patient reports dizziness for the last few days which worsened last night to the point of falling over. She also reports a headache beginning 8 days ago, which has worsened in the last 6 days. She also reports shaking, confusion, and nausea in the last few days. She also reports intermittent right ear pain with her headache. She states her symptoms are better when sitting and worsen when standing. She was able to walk on her own today. The patient took Tylenol at home with some relief of her symptoms. The patient states she has never experienced similar symptoms in the past.  She reports a stroke about 2 years ago, for which she underwent an MRI. Per chart review, a stroke is not indicated at that time. The patient is reportedly sensitive to changes in medications and states that her Topamax medication  changed 11 days ago. The patient is on Mirena for birth control and denies any chance of pregnancy. The patient states she is vaccinated and boosted for Covid. She denies experiencing any fever, chills, cough, or congestion. She also denies chest pain, shortness of breath, weakness on one side, or slurred speech. She reports no abdominal pain, diarrhea, vomiting, or stool symptoms.     Imaging from [facility] on 2/23/20:   MRI Brain with and without Contrast   Normal MRI of the brain.    Review of Systems   Constitutional: Negative for chills and fever.   HENT: Positive for ear pain (right ear). Negative for congestion.    Respiratory: Negative for cough and shortness of breath.    Cardiovascular: Negative for chest pain.   Gastrointestinal: Positive for nausea. Negative for abdominal  pain, blood in stool, constipation, diarrhea and vomiting.   Neurological: Positive for dizziness and headaches. Negative for speech difficulty and weakness.   Psychiatric/Behavioral: Positive for confusion.   All other systems reviewed and are negative.      Allergies:  Latex  Penicillins  Sulfa drugs     Medications:  topiramate (TOPAMAX) 25 MG tablet    DULoxetine (CYMBALTA) 30 MG capsule    levonorgestrel (MIRENA) 20 MCG/24HR IUD  lithium ER (LITHOBID) 300 MG CR tablet    Past Medical History:     Bipolar disorder  Ectopic pregnancy   Obesity   Chronic migraines  Psoriasis   Fibromyalgia   Hemiparesis  Paresthesia  Cerebral artery occlusion     Past Surgical History:    Arthroscopy knee   Wrist arthroscopy  EGD combined   Gardner teeth     Family History:    Sister: diabetes, anxiety, depression  Father: arthritis, atrial fibrillation  Mother: stroke    Social History:  The patient presents to the emergency department with her mother.   The patient arrived to the emergency department via car.     Physical Exam     Patient Vitals for the past 24 hrs:   BP Temp Temp src Pulse Resp SpO2   02/16/22 1701 -- -- -- -- 18 100 %   02/16/22 1438 (!) 167/96 98.3  F (36.8  C) Temporal 87 20 100 %       Physical Exam  General: Adult female sitting upright  Eyes: PERRL, Conjunctive within normal limits.  EOMI.  ENT: Moist mucous membranes, oropharynx clear.    Neck: No rigidity.  Normal spontaneous range of motion.  CV: Normal S1S2, no murmur, rub or gallop. Regular rate and rhythm  Resp: Clear to auscultation bilaterally, no wheezes, rales or rhonchi. Normal respiratory effort.  GI: Abdomen is soft, nontender and nondistended. No palpable masses. No rebound or guarding.  MSK: No edema. Nontender. Normal active range of motion.  Skin: Warm and dry. No rashes or lesions or ecchymoses on visible skin.  Neuro: Alert and oriented. Responds appropriately to all questions and commands. No focal findings appreciated. Normal muscle  tone.  No facial asymmetry.  Speech is normal.  Psych: Normal mood and affect. Pleasant.    Emergency Department Course   ECG  ECG obtained at 1629, ECG read at 1631  Normal sinus rhythm   Rate 84 bpm. MD interval 138 ms. QRS duration 92 ms. QT/QTc 392/463 ms. P-R-T axes 60 66 56.     Imaging:  MR Brain w/o & w Contrast   Final Result   IMPRESSION:      1.  Unremarkable brain MRI.        Report per radiology    Laboratory:  Labs Ordered and Resulted from Time of ED Arrival to Time of ED Departure   BASIC METABOLIC PANEL - Abnormal       Result Value    Sodium 142      Potassium 3.4      Chloride 110 (*)     Carbon Dioxide (CO2) 30      Anion Gap 2 (*)     Urea Nitrogen 7      Creatinine 0.73      Calcium 9.3      Glucose 96      GFR Estimate >90     CBC WITH PLATELETS AND DIFFERENTIAL - Abnormal    WBC Count 11.1 (*)     RBC Count 4.41      Hemoglobin 12.7      Hematocrit 39.4      MCV 89      MCH 28.8      MCHC 32.2      RDW 12.4      Platelet Count 362      % Neutrophils 66      % Lymphocytes 26      % Monocytes 6      % Eosinophils 1      % Basophils 1      % Immature Granulocytes 0      NRBCs per 100 WBC 0      Absolute Neutrophils 7.3      Absolute Lymphocytes 2.8      Absolute Monocytes 0.7      Absolute Eosinophils 0.1      Absolute Basophils 0.1      Absolute Immature Granulocytes 0.0      Absolute NRBCs 0.0     TOPIRAMATE LEVEL   Lithium level pending     Emergency Department Course:     Reviewed:  I reviewed nursing notes, vitals, past medical history, Care Everywhere and [facility]    Assessments:  1641 I obtained history and examined the patient as noted above.   1956 I rechecked the patient and explained findings. The patient stated she felt overall improved although still has a mild headache.     Interventions:  1840: gadobutrol (GADAVIST) injection 10 mL, IV   2016: acetaminophen (TYLENOL) tablet 1,000 mg, p.o.    Disposition:  The patient was discharged to home.     Impression & Plan     Medical Decision  Making:  Daylin Yates is a 36 year old female who presents with a headache and dizziness that she feels are secondary to a different  of Topamax, reporting a significant medication sensitivity even to from a pharmaceutical company changes. She has a history of headaches but minor.  This duration and type is new for her. I considered a broad differential diagnosis for this patient including tension, migraine, analgesic rebound, occipital neuralgia, etc.  I also considered other less common but serious causes considered included meningitis, encephalitis, subarachnoid bleed, temporal arteritis, stroke, tumor, etc.  The headache is associated with dizziness that could be related to vertigo but is intermittent this is clearly not a vertiginous sensation so would not do further workup for peripheral or central vertigo.  A broad differential of their lightheadedness was considered including anemia, electrolyte abnormality, medication, stroike, ACS, orthostasis, dissection, dehydration, presyncope, arrhythmias, metabolic disturbances, endocrine disturbances, hyperglycemia, infection, etc. MRI brain was obtained due to the new headache with possible vertigo in the setting of past history of reported CVA, although this was not found on part review.  No serious etiologies of dizziness or headache were found in workup here today including an MRI results.  She felt some improvement over her stay.  She does not appear unwell or toxic.  Supportive outpatient management is indicated.  She is given a prescription for Topamax with a preference for the past pharmaceutical company who provided it, and the case that this is a strange reaction to a different manufactures Topamax.   Given her overall well appearance and reassuring assessment here today, supportive outpatient management is  indicated.  Headache and dizziness precautions given for home.  She feels comfortable to plan.  All questions were answered prior to  discharge.  She should return with worsening symptoms.  Lithium and topiramate levels pending on discharge which the patient is aware of.    Diagnosis:    ICD-10-CM    1. Acute intractable headache, unspecified headache type  R51.9    2. Dizziness  R42        Discharge Medications:   Details   topiramate (TOPAMAX) 25 MG tablet Take 1 tablet (25 mg) by mouth 2 times daily, Disp-60 tablet, R-0, E-PrescribeManufacturer: [employer] please       Scribe Disclosure:  ILinda, am serving as a scribe at 4:41 PM on 2/16/2022 to document services personally performed by Jeimy Barker MD based on my observations and the provider's statements to me.     I, Pranay Abrams, am serving as a scribe  on 2/16/2022 at 10:01 PM to document services personally performed by Jeimy Barker MD based on my observations and the provider's statements to me.            Jeimy Barker MD  02/16/22 2423

## 2022-02-17 LAB
ATRIAL RATE - MUSE: 84 BPM
DIASTOLIC BLOOD PRESSURE - MUSE: NORMAL MMHG
INTERPRETATION ECG - MUSE: NORMAL
P AXIS - MUSE: 60 DEGREES
PR INTERVAL - MUSE: 138 MS
QRS DURATION - MUSE: 92 MS
QT - MUSE: 392 MS
QTC - MUSE: 463 MS
R AXIS - MUSE: 66 DEGREES
SYSTOLIC BLOOD PRESSURE - MUSE: NORMAL MMHG
T AXIS - MUSE: 56 DEGREES
VENTRICULAR RATE- MUSE: 84 BPM

## 2022-02-20 ENCOUNTER — TELEPHONE (OUTPATIENT)
Dept: EMERGENCY MEDICINE | Facility: CLINIC | Age: 37
End: 2022-02-20
Payer: COMMERCIAL

## 2022-02-20 LAB — TOPIRAMATE SERPL-MCNC: <1.5 UG/ML

## 2022-02-20 NOTE — TELEPHONE ENCOUNTER
[facility] Emergency Department/Urgent Care Lab result notification:    Reason for call  Notify of lab results, assess symptoms,  review ED providers recommendations (if necessary) and advise per ED lab result f/u protocol.    Lab result  Final Topiramate (Topamax) Level level is <1.5 ug/mL and this level is [LOW ].    Normal reference range for Topiramate (Topamax) is 5.0-20.0 ug/mL   Resulted after [facility] Emergency Dept visit on 2/16/22 (date).  Patient to be notified of result and advised to relay result to their Neurologist or physician who manages the medication dosing.    10:42AM: Left voicemail message requesting a call back to [phone] between 9 a.m. and 5:30 p.m. for patient's ED/UC lab results.      Elba Gastelum RN  [facility] Result RN  [facility] Emergency Dept Lab Result RN  Ph# [phone]

## 2022-02-21 NOTE — TELEPHONE ENCOUNTER
[facility] Emergency Department/Urgent Care Lab result notification:     Reason for call  Notify of lab results, assess symptoms,  review ED providers recommendations (if necessary) and advise per ED lab result f/u protocol.     Lab result  Final Topiramate (Topamax) Level level is <1.5 ug/mL and this level is [LOW ].    Normal reference range for Topiramate (Topamax) is 5.0-20.0 ug/mL   Resulted after [facility] Emergency Dept visit on 2/16/22 (date).  Patient to be notified of result and advised to relay result to their Neurologist or physician who manages the medication dosing.    RN Assessment (Patient s current Symptoms), include time called.  [Insert Left message here if message left]  3:41PM: Spoke with patient. She states she has been doing better since getting the different  of the Topiramate pills.     RN Recommendations/Instructions per [facility] ED lab result protocol  Patient notified of lab result and advised to relay result to the physician who manages the medication dosing.  The patient has no further questions.      Please Contact your PCP clinic or return to the Emergency department if your:    Symptoms return.    Symptoms worsen or other concerning symptom's.    PCP follow-up Questions asked: YES       Elba Gastelum RN  [facility]  Emergency Dept Lab Result RN  # [phone]

## 2023-12-02 ENCOUNTER — HOSPITAL ENCOUNTER (EMERGENCY)
Facility: CLINIC | Age: 38
Discharge: HOME | End: 2023-12-02
Attending: EMERGENCY MEDICINE | Admitting: EMERGENCY MEDICINE
Payer: COMMERCIAL

## 2023-12-02 VITALS
TEMPERATURE: 98.5 F | HEART RATE: 93 BPM | RESPIRATION RATE: 16 BRPM | SYSTOLIC BLOOD PRESSURE: 131 MMHG | DIASTOLIC BLOOD PRESSURE: 93 MMHG | OXYGEN SATURATION: 100 %

## 2023-12-02 DIAGNOSIS — H65.191 OTHER NON-RECURRENT ACUTE NONSUPPURATIVE OTITIS MEDIA OF RIGHT EAR: ICD-10-CM

## 2023-12-02 PROCEDURE — 99283 EMERGENCY DEPT VISIT LOW MDM: CPT

## 2023-12-02 RX ORDER — CEFDINIR 300 MG/1
300 CAPSULE ORAL 2 TIMES DAILY
Qty: 14 CAPSULE | Refills: 0 | Status: SHIPPED | OUTPATIENT
Start: 2023-12-02 | End: 2023-12-09

## 2023-12-02 ASSESSMENT — ACTIVITIES OF DAILY LIVING (ADL): ADLS_ACUITY_SCORE: 35

## 2023-12-03 NOTE — DISCHARGE INSTRUCTIONS
Motrin and tylenol for pain as needed  Start antibiotic today  Recheck with you doctor in 3 days if not better

## 2023-12-03 NOTE — ED TRIAGE NOTES
Here with complaints of right ear pain. Started today.      Triage Assessment (Adult)       Row Name 12/02/23 1941          Triage Assessment    Airway WDL WDL        Respiratory WDL    Respiratory WDL WDL        Skin Circulation/Temperature WDL    Skin Circulation/Temperature WDL WDL        Cardiac WDL    Cardiac WDL WDL        Peripheral/Neurovascular WDL    Peripheral Neurovascular WDL WDL        Cognitive/Neuro/Behavioral WDL    Cognitive/Neuro/Behavioral WDL WDL

## 2023-12-03 NOTE — ED PROVIDER NOTES
History     Chief Complaint:  Otalgia       HPI   Daylin JAMES Yates is a 38 year old female with a history of frequent ear infections as a kid who presents with severe right ear pain.  She stated that she started tonight.  She is to have a lot of ear infections as a kid so she knows what her symptoms mean.  She is also been battling upper respiratory infection along with congestion for the last month or so.  She does have an inhaler that she uses as well.  She denies any fever.  No drainage.  No recent water exposure.      Independent Historian:   None - Patient Only    Review of External Notes:   none       Medications:    DULoxetine (CYMBALTA) 20 MG capsule  levonorgestrel (MIRENA) 20 MCG/24HR IUD  lithium ER (LITHOBID) 300 MG CR tablet  multivitamin, therapeutic (THERA-VIT) TABS tablet  topiramate (TOPAMAX) 25 MG tablet  triamcinolone (KENALOG) 0.1 % external cream        Past Medical History:    Past Medical History:   Diagnosis Date    Bipolar disorder (H) 2011    Ectopic pregnancy 2007       Past Surgical History:    Past Surgical History:   Procedure Laterality Date    ARTHROSCOPY KNEE      ESOPHAGOSCOPY, GASTROSCOPY, DUODENOSCOPY (EGD), COMBINED  10/16/2012    Procedure: COMBINED ESOPHAGOSCOPY, GASTROSCOPY, DUODENOSCOPY (EGD), BIOPSY SINGLE OR MULTIPLE;;  Surgeon: Johny Celaya MD;  Location:  GI    PODIATRY/FOOT & ANKLE SURGERY REFERRAL      wisdom teeth      ZZC WRIST ARTHROSCOP,CLEAN/DRAIN          Physical Exam   Patient Vitals for the past 24 hrs:   BP Temp Temp src Pulse Resp SpO2   12/02/23 1941 138/89 98.5  F (36.9  C) Temporal 95 16 100 %        Physical Exam  Constitutional:       Appearance: She is well-developed.   HENT:      Right Ear: External ear normal.      Left Ear: Tympanic membrane and external ear normal.      Ears:      Comments: R TM partially erythematous.      Mouth/Throat:      Mouth: Mucous membranes are moist.      Pharynx: Oropharynx is clear. No oropharyngeal exudate or  posterior oropharyngeal erythema.   Eyes:      General: No scleral icterus.     Conjunctiva/sclera: Conjunctivae normal.      Pupils: Pupils are equal, round, and reactive to light.   Cardiovascular:      Rate and Rhythm: Normal rate and regular rhythm.      Heart sounds: Normal heart sounds. No murmur heard.     No friction rub. No gallop.   Pulmonary:      Effort: Pulmonary effort is normal. No respiratory distress.      Breath sounds: Normal breath sounds. No wheezing or rales.   Musculoskeletal:      Cervical back: Normal range of motion and neck supple.   Lymphadenopathy:      Cervical: No cervical adenopathy.   Skin:     General: Skin is warm and dry.      Capillary Refill: Capillary refill takes less than 2 seconds.      Findings: No rash.   Neurological:      Mental Status: She is alert.           Emergency Department Course       Emergency Department Course & Assessments:             Interventions:  Medications - No data to display     Assessments:  2027 Exam    Independent Interpretation (X-rays, CTs, rhythm strip):  None    Consultations/Discussion of Management or Tests:  None        Social Determinants of Health affecting care:   None    Disposition:  The patient was discharged to home.     Impression & Plan      Medical Decision Making:  Patient presents today with right ear pain.  She has an obvious otitis media on the right side.  She has a history of ear infections in the past as well.  Given her penicillin allergy, we will go ahead and give her a course of cefdinir.  She is asked to start that today use ibuprofen and Tylenol.  Return precaution provided.  She should recheck with her doctor symptoms are not better after several days.      Diagnosis:    ICD-10-CM    1. Other non-recurrent acute nonsuppurative otitis media of right ear  H65.191            Discharge Medications:  New Prescriptions    CEFDINIR (OMNICEF) 300 MG CAPSULE    Take 1 capsule (300 mg) by mouth 2 times daily for 7 days             12/2/2023   Derrick Raman MD Cheng, Wenlan, MD  12/02/23 2038

## 2025-03-10 ENCOUNTER — OFFICE VISIT (OUTPATIENT)
Dept: OBGYN | Facility: CLINIC | Age: 40
End: 2025-03-10
Payer: COMMERCIAL

## 2025-03-10 VITALS
SYSTOLIC BLOOD PRESSURE: 112 MMHG | WEIGHT: 185 LBS | HEIGHT: 68 IN | DIASTOLIC BLOOD PRESSURE: 70 MMHG | BODY MASS INDEX: 28.04 KG/M2

## 2025-03-10 DIAGNOSIS — Z12.4 CERVICAL CANCER SCREENING: ICD-10-CM

## 2025-03-10 DIAGNOSIS — Z01.419 WOMEN'S ANNUAL ROUTINE GYNECOLOGICAL EXAMINATION: Primary | ICD-10-CM

## 2025-03-10 PROCEDURE — 99385 PREV VISIT NEW AGE 18-39: CPT | Performed by: STUDENT IN AN ORGANIZED HEALTH CARE EDUCATION/TRAINING PROGRAM

## 2025-03-10 PROCEDURE — 99459 PELVIC EXAMINATION: CPT | Performed by: STUDENT IN AN ORGANIZED HEALTH CARE EDUCATION/TRAINING PROGRAM

## 2025-03-10 PROCEDURE — G0145 SCR C/V CYTO,THINLAYER,RESCR: HCPCS | Performed by: STUDENT IN AN ORGANIZED HEALTH CARE EDUCATION/TRAINING PROGRAM

## 2025-03-10 NOTE — NURSING NOTE
"Chief Complaint   Patient presents with    Physical       Initial /70   Ht 1.727 m (5' 8\")   Wt 83.9 kg (185 lb)   BMI 28.13 kg/m   Estimated body mass index is 28.13 kg/m  as calculated from the following:    Height as of this encounter: 1.727 m (5' 8\").    Weight as of this encounter: 83.9 kg (185 lb).  BP completed using cuff size: regular    Questioned patient about current smoking habits.  Pt. has never smoked.          The following HM Due: pap smear    IUD placed 20    Ana María Boyd CMA on 3/10/2025 at 9:11 AM    "

## 2025-03-10 NOTE — PROGRESS NOTES
[facility]  Annual Health Maintenance Visit  Date: 03/10/2025    CC: Annual GYN exam    HPI:  Daylin Yates is a 39 year old  who is here for annual GYN exam.      Concerns today:  - No major concerns today.    Ob/Gyn History:    No LMP recorded. (Menstrual status: IUD)..   - Menses are absent. No spotting. No pain.   - Sexually Active: Yes  - Sexual Partner: 1 male partner BF  - Dyspareunia: No  - Contraception: Mirena  - Discharge: None bothersome, no itching  - Last pap smear: . Result: NILM HPV neg  - Any history of abnormal pap: No  - History of STDs: No  - Incontinence: No  - Menopausal symptoms: No    Immunizations:    Immunization History   Administered Date(s) Administered    COVID-19 MONOVALENT 12+ (Pfizer) 2021    COVID-19 Vaccine (Double Doods) 2021    DTAP (<7y) 1992, 2000    HEPA 2006    HIB (PRP-T) 1989    HepB 1994, 2000, 2004    Historical DTP/aP 1987, 1988, 1988, 1989    MMR 1989, 2000    Meningococcal ACWY (Menactra ) 2006    Poliovirus, inactivated (IPV) 1987, 1988, 1989, 1992    TDAP (Adacel,Boostrix) 2014    Varicella Pt Report Hx of Varicella/Chicken Pox 2005       PMH:    Past Medical History:   Diagnosis Date    Bipolar disorder (H)     Lamictal weaned off at beginning of preg    Ectopic pregnancy 2007    MTX       Prob List:    Patient Active Problem List   Diagnosis    Cervical smear, as part of routine gynecological examination    Presence of intrauterine contraceptive device (IUD)    Multiple complaints       PSH:    Past Surgical History:   Procedure Laterality Date    ARTHROSCOPY KNEE      ESOPHAGOSCOPY, GASTROSCOPY, DUODENOSCOPY (EGD), COMBINED  10/16/2012    Procedure: COMBINED ESOPHAGOSCOPY, GASTROSCOPY, DUODENOSCOPY (EGD), BIOPSY SINGLE OR MULTIPLE;;  Surgeon: Johny Celaya MD;  Location: [facility]    IR LUMBAR  "PUNCTURE  3/10/2020    PODIATRY/FOOT & ANKLE SURGERY REFERRAL      wisdom teeth      ZZC WRIST ARTHROSCOP,CLEAN/DRAIN         Allergies:    Allergies   Allergen Reactions    Penicillins Anaphylaxis    Sulfa Antibiotics Hives    Latex Hives       Medications:    Current Outpatient Medications   Medication Sig Dispense Refill    DULoxetine (CYMBALTA) 20 MG capsule Take 20 mg by mouth 2 times daily      lithium ER (LITHOBID) 300 MG CR tablet Take 600 mg by mouth 2 times daily      multivitamin, therapeutic (THERA-VIT) TABS tablet Take 1 tablet by mouth daily      Semaglutide-Weight Management (WEGOVY) 1.7 MG/0.75ML pen Inject 1.7 mg subcutaneously once a week.      topiramate (TOPAMAX) 25 MG tablet Take 1 tablet (25 mg) by mouth 2 times daily 60 tablet 0    levonorgestrel (MIRENA) 20 MCG/24HR IUD 1 each (20 mcg) by Intrauterine route once for 1 dose 1 each 0     No current facility-administered medications for this visit.       FH:    Family History   Problem Relation Age of Onset    Diabetes Maternal Aunt     Diabetes Maternal Grandmother     Diabetes Sister 3    Diabetes Maternal Uncle 18    Breast Cancer Paternal Aunt     Alzheimer Disease Paternal Grandmother        SH:    Social History     Tobacco Use    Smoking status: Never    Smokeless tobacco: Never   Substance Use Topics    Alcohol use: Yes     Comment: Once drink weekly     Drug use: No       History   Smoking Status    Never   Smokeless Tobacco    Never       Review of systems:  With the exception of any items noted above, the 10 point ROS was negative.    O:  /70   Ht 1.727 m (5' 8\")   Wt 83.9 kg (185 lb)   BMI 28.13 kg/m      Exam:  GEN: Appears well, NAD  CV: Well perfused  PULM: NWOB  BREAST: Bilaterally without focal masses, lesions, dimpling, rash; no axillary lymphadenopathy. Left nipple slightly retracted which is has been present for years.  ABD: Soft, non-tender, non-distended  : Normal external genitalia. Vagina normal. Cervix normal. " IUD strings visualized. Bimanual limited by habitus. No CMT, no adnexal masses, no adnexal tenderness. Normal discharge, no lesions, no bleeding. Exam chaperoned by Ana María Boyd MA   SKIN: Appears normal without rashes or lesions.  EXT: Warm, well perfused, no edema    A/P:  Daylin Yates is a 39 year old  who presents for annual GYN exam.   Age appropriate counseling.  Other issues addressed today as below.    #Screening  - GC/CT: Declined  - Cervical cancer: Pap smear/cotest collected  - Clinical breast exam: Unremarkable    #Immunizations  - Influenza yearly - hives  - COVID - got booster this year not in chart    #Contraception  - Mirena    Follow up in 1 year, sooner if questions or concerns.    Khadar Szymanski MD MPH  [facility]  03/10/2025 9:28 AM

## 2025-03-13 LAB
BKR AP ASSOCIATED HPV REPORT: NORMAL
BKR LAB AP GYN ADEQUACY: NORMAL
BKR LAB AP GYN INTERPRETATION: NORMAL
BKR LAB AP PREVIOUS ABNORMAL: NORMAL
PATH REPORT.COMMENTS IMP SPEC: NORMAL
PATH REPORT.COMMENTS IMP SPEC: NORMAL
PATH REPORT.RELEVANT HX SPEC: NORMAL